# Patient Record
Sex: FEMALE | Race: WHITE | Employment: PART TIME | URBAN - METROPOLITAN AREA
[De-identification: names, ages, dates, MRNs, and addresses within clinical notes are randomized per-mention and may not be internally consistent; named-entity substitution may affect disease eponyms.]

---

## 2019-10-14 ENCOUNTER — HOSPITAL ENCOUNTER (INPATIENT)
Age: 69
LOS: 2 days | Discharge: HOME | DRG: 286 | End: 2019-10-16
Attending: EMERGENCY MEDICINE | Admitting: INTERNAL MEDICINE
Payer: MEDICARE

## 2019-10-14 ENCOUNTER — APPOINTMENT (OUTPATIENT)
Dept: CT IMAGING | Age: 69
DRG: 286 | End: 2019-10-14
Payer: MEDICARE

## 2019-10-14 ENCOUNTER — APPOINTMENT (OUTPATIENT)
Dept: GENERAL RADIOLOGY | Age: 69
DRG: 286 | End: 2019-10-14
Payer: MEDICARE

## 2019-10-14 DIAGNOSIS — J81.0 ACUTE PULMONARY EDEMA (HCC): Primary | ICD-10-CM

## 2019-10-14 DIAGNOSIS — R09.02 HYPOXIA: ICD-10-CM

## 2019-10-14 DIAGNOSIS — I50.23 ACUTE ON CHRONIC SYSTOLIC (CONGESTIVE) HEART FAILURE (HCC): ICD-10-CM

## 2019-10-14 DIAGNOSIS — I50.9 ACUTE CONGESTIVE HEART FAILURE, UNSPECIFIED HEART FAILURE TYPE (HCC): ICD-10-CM

## 2019-10-14 LAB
A/G RATIO: 1.5 (ref 1.1–2.2)
ALBUMIN SERPL-MCNC: 4.7 G/DL (ref 3.4–5)
ALP BLD-CCNC: 76 U/L (ref 40–129)
ALT SERPL-CCNC: 23 U/L (ref 10–40)
ANION GAP SERPL CALCULATED.3IONS-SCNC: 18 MMOL/L (ref 3–16)
APTT: 29.9 SEC (ref 26–36)
AST SERPL-CCNC: 27 U/L (ref 15–37)
BASOPHILS ABSOLUTE: 0.1 K/UL (ref 0–0.2)
BASOPHILS RELATIVE PERCENT: 0.7 %
BILIRUB SERPL-MCNC: 0.6 MG/DL (ref 0–1)
BUN BLDV-MCNC: 12 MG/DL (ref 7–20)
CALCIUM SERPL-MCNC: 9.3 MG/DL (ref 8.3–10.6)
CHLORIDE BLD-SCNC: 104 MMOL/L (ref 99–110)
CO2: 20 MMOL/L (ref 21–32)
CREAT SERPL-MCNC: 0.7 MG/DL (ref 0.6–1.2)
EKG ATRIAL RATE: 118 BPM
EKG DIAGNOSIS: NORMAL
EKG P AXIS: 66 DEGREES
EKG P-R INTERVAL: 134 MS
EKG Q-T INTERVAL: 344 MS
EKG QRS DURATION: 98 MS
EKG QTC CALCULATION (BAZETT): 482 MS
EKG R AXIS: 66 DEGREES
EKG T AXIS: 141 DEGREES
EKG VENTRICULAR RATE: 118 BPM
EOSINOPHILS ABSOLUTE: 0.1 K/UL (ref 0–0.6)
EOSINOPHILS RELATIVE PERCENT: 1.6 %
GFR AFRICAN AMERICAN: >60
GFR NON-AFRICAN AMERICAN: >60
GLOBULIN: 3.1 G/DL
GLUCOSE BLD-MCNC: 206 MG/DL (ref 70–99)
HCT VFR BLD CALC: 44.6 % (ref 36–48)
HEMOGLOBIN: 14.7 G/DL (ref 12–16)
INR BLD: 0.93 (ref 0.86–1.14)
LYMPHOCYTES ABSOLUTE: 2.5 K/UL (ref 1–5.1)
LYMPHOCYTES RELATIVE PERCENT: 26.9 %
MCH RBC QN AUTO: 30.2 PG (ref 26–34)
MCHC RBC AUTO-ENTMCNC: 33 G/DL (ref 31–36)
MCV RBC AUTO: 91.5 FL (ref 80–100)
MONOCYTES ABSOLUTE: 0.6 K/UL (ref 0–1.3)
MONOCYTES RELATIVE PERCENT: 6.5 %
NEUTROPHILS ABSOLUTE: 5.9 K/UL (ref 1.7–7.7)
NEUTROPHILS RELATIVE PERCENT: 64.3 %
PDW BLD-RTO: 13.6 % (ref 12.4–15.4)
PLATELET # BLD: 274 K/UL (ref 135–450)
PMV BLD AUTO: 9.4 FL (ref 5–10.5)
POTASSIUM REFLEX MAGNESIUM: 3.8 MMOL/L (ref 3.5–5.1)
PRO-BNP: 1224 PG/ML (ref 0–124)
PROTHROMBIN TIME: 10.6 SEC (ref 9.8–13)
RBC # BLD: 4.88 M/UL (ref 4–5.2)
SODIUM BLD-SCNC: 142 MMOL/L (ref 136–145)
TOTAL PROTEIN: 7.8 G/DL (ref 6.4–8.2)
TROPONIN: 0.04 NG/ML
TROPONIN: 0.04 NG/ML
TROPONIN: <0.01 NG/ML
WBC # BLD: 9.1 K/UL (ref 4–11)

## 2019-10-14 PROCEDURE — 2700000000 HC OXYGEN THERAPY PER DAY

## 2019-10-14 PROCEDURE — 71260 CT THORAX DX C+: CPT

## 2019-10-14 PROCEDURE — 6360000002 HC RX W HCPCS: Performed by: PHYSICIAN ASSISTANT

## 2019-10-14 PROCEDURE — 80053 COMPREHEN METABOLIC PANEL: CPT

## 2019-10-14 PROCEDURE — 85610 PROTHROMBIN TIME: CPT

## 2019-10-14 PROCEDURE — 83880 ASSAY OF NATRIURETIC PEPTIDE: CPT

## 2019-10-14 PROCEDURE — 84484 ASSAY OF TROPONIN QUANT: CPT

## 2019-10-14 PROCEDURE — 85025 COMPLETE CBC W/AUTO DIFF WBC: CPT

## 2019-10-14 PROCEDURE — 93005 ELECTROCARDIOGRAM TRACING: CPT | Performed by: EMERGENCY MEDICINE

## 2019-10-14 PROCEDURE — 2580000003 HC RX 258: Performed by: INTERNAL MEDICINE

## 2019-10-14 PROCEDURE — 83036 HEMOGLOBIN GLYCOSYLATED A1C: CPT

## 2019-10-14 PROCEDURE — 1200000000 HC SEMI PRIVATE

## 2019-10-14 PROCEDURE — 6370000000 HC RX 637 (ALT 250 FOR IP): Performed by: PHYSICIAN ASSISTANT

## 2019-10-14 PROCEDURE — 71045 X-RAY EXAM CHEST 1 VIEW: CPT

## 2019-10-14 PROCEDURE — 85730 THROMBOPLASTIN TIME PARTIAL: CPT

## 2019-10-14 PROCEDURE — 6360000002 HC RX W HCPCS: Performed by: INTERNAL MEDICINE

## 2019-10-14 PROCEDURE — 99285 EMERGENCY DEPT VISIT HI MDM: CPT

## 2019-10-14 PROCEDURE — 94660 CPAP INITIATION&MGMT: CPT

## 2019-10-14 PROCEDURE — 6360000004 HC RX CONTRAST MEDICATION: Performed by: EMERGENCY MEDICINE

## 2019-10-14 PROCEDURE — 6370000000 HC RX 637 (ALT 250 FOR IP): Performed by: INTERNAL MEDICINE

## 2019-10-14 PROCEDURE — 36415 COLL VENOUS BLD VENIPUNCTURE: CPT

## 2019-10-14 PROCEDURE — 94761 N-INVAS EAR/PLS OXIMETRY MLT: CPT

## 2019-10-14 PROCEDURE — 94760 N-INVAS EAR/PLS OXIMETRY 1: CPT

## 2019-10-14 PROCEDURE — 96374 THER/PROPH/DIAG INJ IV PUSH: CPT

## 2019-10-14 RX ORDER — FUROSEMIDE 10 MG/ML
40 INJECTION INTRAMUSCULAR; INTRAVENOUS ONCE
Status: COMPLETED | OUTPATIENT
Start: 2019-10-14 | End: 2019-10-14

## 2019-10-14 RX ORDER — ACETAMINOPHEN 325 MG/1
650 TABLET ORAL EVERY 4 HOURS PRN
Status: DISCONTINUED | OUTPATIENT
Start: 2019-10-14 | End: 2019-10-16 | Stop reason: SDUPTHER

## 2019-10-14 RX ORDER — SODIUM CHLORIDE 0.9 % (FLUSH) 0.9 %
10 SYRINGE (ML) INJECTION EVERY 12 HOURS SCHEDULED
Status: DISCONTINUED | OUTPATIENT
Start: 2019-10-14 | End: 2019-10-16 | Stop reason: SDUPTHER

## 2019-10-14 RX ORDER — FUROSEMIDE 10 MG/ML
40 INJECTION INTRAMUSCULAR; INTRAVENOUS DAILY
Status: DISCONTINUED | OUTPATIENT
Start: 2019-10-14 | End: 2019-10-15

## 2019-10-14 RX ORDER — MAGNESIUM SULFATE 1 G/100ML
1 INJECTION INTRAVENOUS PRN
Status: DISCONTINUED | OUTPATIENT
Start: 2019-10-14 | End: 2019-10-16 | Stop reason: HOSPADM

## 2019-10-14 RX ORDER — LISINOPRIL 5 MG/1
5 TABLET ORAL DAILY
Status: DISCONTINUED | OUTPATIENT
Start: 2019-10-14 | End: 2019-10-15

## 2019-10-14 RX ORDER — ONDANSETRON 2 MG/ML
4 INJECTION INTRAMUSCULAR; INTRAVENOUS EVERY 6 HOURS PRN
Status: DISCONTINUED | OUTPATIENT
Start: 2019-10-14 | End: 2019-10-16 | Stop reason: SDUPTHER

## 2019-10-14 RX ORDER — ACETAMINOPHEN 325 MG/1
650 TABLET ORAL ONCE
Status: COMPLETED | OUTPATIENT
Start: 2019-10-14 | End: 2019-10-14

## 2019-10-14 RX ORDER — POTASSIUM CHLORIDE 20 MEQ/1
40 TABLET, EXTENDED RELEASE ORAL PRN
Status: DISCONTINUED | OUTPATIENT
Start: 2019-10-14 | End: 2019-10-16 | Stop reason: HOSPADM

## 2019-10-14 RX ORDER — POTASSIUM CHLORIDE 7.45 MG/ML
10 INJECTION INTRAVENOUS PRN
Status: DISCONTINUED | OUTPATIENT
Start: 2019-10-14 | End: 2019-10-16 | Stop reason: HOSPADM

## 2019-10-14 RX ORDER — ATORVASTATIN CALCIUM 20 MG/1
20 TABLET, FILM COATED ORAL NIGHTLY
Status: DISCONTINUED | OUTPATIENT
Start: 2019-10-14 | End: 2019-10-15

## 2019-10-14 RX ORDER — ASPIRIN 81 MG/1
81 TABLET, CHEWABLE ORAL DAILY
Status: DISCONTINUED | OUTPATIENT
Start: 2019-10-14 | End: 2019-10-16 | Stop reason: HOSPADM

## 2019-10-14 RX ORDER — METOPROLOL SUCCINATE 25 MG/1
25 TABLET, EXTENDED RELEASE ORAL DAILY
Status: DISCONTINUED | OUTPATIENT
Start: 2019-10-14 | End: 2019-10-15

## 2019-10-14 RX ORDER — SODIUM CHLORIDE 0.9 % (FLUSH) 0.9 %
10 SYRINGE (ML) INJECTION PRN
Status: DISCONTINUED | OUTPATIENT
Start: 2019-10-14 | End: 2019-10-16 | Stop reason: SDUPTHER

## 2019-10-14 RX ADMIN — IOPAMIDOL 75 ML: 755 INJECTION, SOLUTION INTRAVENOUS at 11:05

## 2019-10-14 RX ADMIN — NITROGLYCERIN 0.5 INCH: 20 OINTMENT TOPICAL at 10:34

## 2019-10-14 RX ADMIN — ACETAMINOPHEN 650 MG: 325 TABLET ORAL at 21:00

## 2019-10-14 RX ADMIN — ASPIRIN 81 MG 81 MG: 81 TABLET ORAL at 16:30

## 2019-10-14 RX ADMIN — ATORVASTATIN CALCIUM 20 MG: 20 TABLET, FILM COATED ORAL at 21:00

## 2019-10-14 RX ADMIN — METOPROLOL SUCCINATE 25 MG: 25 TABLET, EXTENDED RELEASE ORAL at 16:30

## 2019-10-14 RX ADMIN — FUROSEMIDE 40 MG: 10 INJECTION, SOLUTION INTRAMUSCULAR; INTRAVENOUS at 10:34

## 2019-10-14 RX ADMIN — ACETAMINOPHEN 650 MG: 325 TABLET ORAL at 12:28

## 2019-10-14 RX ADMIN — ACETAMINOPHEN 650 MG: 325 TABLET ORAL at 16:46

## 2019-10-14 RX ADMIN — Medication 10 ML: at 21:01

## 2019-10-14 RX ADMIN — FUROSEMIDE 40 MG: 10 INJECTION, SOLUTION INTRAMUSCULAR; INTRAVENOUS at 16:30

## 2019-10-14 RX ADMIN — LISINOPRIL 5 MG: 5 TABLET ORAL at 16:46

## 2019-10-14 ASSESSMENT — ENCOUNTER SYMPTOMS
SHORTNESS OF BREATH: 1
CONSTIPATION: 0
DIARRHEA: 0
VOMITING: 0
COUGH: 0
NAUSEA: 0
COLOR CHANGE: 0
ABDOMINAL PAIN: 0

## 2019-10-14 ASSESSMENT — PAIN SCALES - GENERAL
PAINLEVEL_OUTOF10: 8
PAINLEVEL_OUTOF10: 4
PAINLEVEL_OUTOF10: 5

## 2019-10-14 ASSESSMENT — PAIN DESCRIPTION - PAIN TYPE
TYPE: ACUTE PAIN

## 2019-10-14 ASSESSMENT — PAIN DESCRIPTION - DESCRIPTORS
DESCRIPTORS: HEADACHE
DESCRIPTORS: HEADACHE
DESCRIPTORS: DULL

## 2019-10-14 ASSESSMENT — PAIN DESCRIPTION - ORIENTATION
ORIENTATION: ANTERIOR

## 2019-10-14 ASSESSMENT — PAIN DESCRIPTION - LOCATION
LOCATION: HEAD

## 2019-10-14 ASSESSMENT — PAIN DESCRIPTION - PROGRESSION
CLINICAL_PROGRESSION: NOT CHANGED
CLINICAL_PROGRESSION: GRADUALLY IMPROVING
CLINICAL_PROGRESSION: NOT CHANGED
CLINICAL_PROGRESSION: GRADUALLY IMPROVING

## 2019-10-14 ASSESSMENT — PAIN DESCRIPTION - ONSET
ONSET: GRADUAL

## 2019-10-14 ASSESSMENT — PAIN - FUNCTIONAL ASSESSMENT
PAIN_FUNCTIONAL_ASSESSMENT: ACTIVITIES ARE NOT PREVENTED
PAIN_FUNCTIONAL_ASSESSMENT: ACTIVITIES ARE NOT PREVENTED
PAIN_FUNCTIONAL_ASSESSMENT: PREVENTS OR INTERFERES SOME ACTIVE ACTIVITIES AND ADLS
PAIN_FUNCTIONAL_ASSESSMENT: PREVENTS OR INTERFERES SOME ACTIVE ACTIVITIES AND ADLS

## 2019-10-14 ASSESSMENT — PAIN DESCRIPTION - FREQUENCY
FREQUENCY: CONTINUOUS

## 2019-10-15 ENCOUNTER — APPOINTMENT (OUTPATIENT)
Dept: CARDIAC CATH/INVASIVE PROCEDURES | Age: 69
DRG: 286 | End: 2019-10-15
Payer: MEDICARE

## 2019-10-15 LAB
ANION GAP SERPL CALCULATED.3IONS-SCNC: 15 MMOL/L (ref 3–16)
BUN BLDV-MCNC: 28 MG/DL (ref 7–20)
CALCIUM SERPL-MCNC: 9.1 MG/DL (ref 8.3–10.6)
CHLORIDE BLD-SCNC: 103 MMOL/L (ref 99–110)
CHOLESTEROL, TOTAL: 222 MG/DL (ref 0–199)
CO2: 22 MMOL/L (ref 21–32)
CREAT SERPL-MCNC: 0.6 MG/DL (ref 0.6–1.2)
ESTIMATED AVERAGE GLUCOSE: 102.5 MG/DL
GFR AFRICAN AMERICAN: >60
GFR NON-AFRICAN AMERICAN: >60
GLUCOSE BLD-MCNC: 118 MG/DL (ref 70–99)
HBA1C MFR BLD: 5.2 %
HDLC SERPL-MCNC: 79 MG/DL (ref 40–60)
LDL CHOLESTEROL CALCULATED: 121 MG/DL
LV EF: 38 %
LVEF MODALITY: NORMAL
MAGNESIUM: 2.3 MG/DL (ref 1.8–2.4)
POTASSIUM SERPL-SCNC: 4.2 MMOL/L (ref 3.5–5.1)
SODIUM BLD-SCNC: 140 MMOL/L (ref 136–145)
TRIGL SERPL-MCNC: 109 MG/DL (ref 0–150)
TROPONIN: 0.02 NG/ML
VLDLC SERPL CALC-MCNC: 22 MG/DL

## 2019-10-15 PROCEDURE — 2580000003 HC RX 258: Performed by: INTERNAL MEDICINE

## 2019-10-15 PROCEDURE — 90686 IIV4 VACC NO PRSV 0.5 ML IM: CPT | Performed by: INTERNAL MEDICINE

## 2019-10-15 PROCEDURE — 80048 BASIC METABOLIC PNL TOTAL CA: CPT

## 2019-10-15 PROCEDURE — G0008 ADMIN INFLUENZA VIRUS VAC: HCPCS | Performed by: INTERNAL MEDICINE

## 2019-10-15 PROCEDURE — 93306 TTE W/DOPPLER COMPLETE: CPT

## 2019-10-15 PROCEDURE — 2700000000 HC OXYGEN THERAPY PER DAY

## 2019-10-15 PROCEDURE — 6370000000 HC RX 637 (ALT 250 FOR IP): Performed by: INTERNAL MEDICINE

## 2019-10-15 PROCEDURE — 36415 COLL VENOUS BLD VENIPUNCTURE: CPT

## 2019-10-15 PROCEDURE — 1200000000 HC SEMI PRIVATE

## 2019-10-15 PROCEDURE — 94761 N-INVAS EAR/PLS OXIMETRY MLT: CPT

## 2019-10-15 PROCEDURE — 84484 ASSAY OF TROPONIN QUANT: CPT

## 2019-10-15 PROCEDURE — 99223 1ST HOSP IP/OBS HIGH 75: CPT | Performed by: INTERNAL MEDICINE

## 2019-10-15 PROCEDURE — 83735 ASSAY OF MAGNESIUM: CPT

## 2019-10-15 PROCEDURE — 6360000002 HC RX W HCPCS: Performed by: INTERNAL MEDICINE

## 2019-10-15 PROCEDURE — 80061 LIPID PANEL: CPT

## 2019-10-15 RX ORDER — FUROSEMIDE 40 MG/1
40 TABLET ORAL DAILY
Status: DISCONTINUED | OUTPATIENT
Start: 2019-10-16 | End: 2019-10-16

## 2019-10-15 RX ORDER — CARVEDILOL 6.25 MG/1
6.25 TABLET ORAL 2 TIMES DAILY WITH MEALS
Status: DISCONTINUED | OUTPATIENT
Start: 2019-10-16 | End: 2019-10-16 | Stop reason: HOSPADM

## 2019-10-15 RX ORDER — VALSARTAN 80 MG/1
80 TABLET ORAL DAILY
Status: DISCONTINUED | OUTPATIENT
Start: 2019-10-16 | End: 2019-10-16 | Stop reason: HOSPADM

## 2019-10-15 RX ORDER — ATORVASTATIN CALCIUM 40 MG/1
40 TABLET, FILM COATED ORAL NIGHTLY
Status: DISCONTINUED | OUTPATIENT
Start: 2019-10-15 | End: 2019-10-16 | Stop reason: HOSPADM

## 2019-10-15 RX ADMIN — METOPROLOL SUCCINATE 25 MG: 25 TABLET, EXTENDED RELEASE ORAL at 09:34

## 2019-10-15 RX ADMIN — LISINOPRIL 5 MG: 5 TABLET ORAL at 09:34

## 2019-10-15 RX ADMIN — ATORVASTATIN CALCIUM 40 MG: 40 TABLET, FILM COATED ORAL at 20:53

## 2019-10-15 RX ADMIN — INFLUENZA A VIRUS A/BRISBANE/02/2018 IVR-190 (H1N1) ANTIGEN (PROPIOLACTONE INACTIVATED), INFLUENZA A VIRUS A/KANSAS/14/2017 X-327 (H3N2) ANTIGEN (PROPIOLACTONE INACTIVATED), INFLUENZA B VIRUS B/MARYLAND/15/2016 ANTIGEN (PROPIOLACTONE INACTIVATED), INFLUENZA B VIRUS B/PHUKET/3073/2013 BVR-1B ANTIGEN (PROPIOLACTONE INACTIVATED) 0.5 ML: 15; 15; 15; 15 INJECTION, SUSPENSION INTRAMUSCULAR at 09:35

## 2019-10-15 RX ADMIN — ENOXAPARIN SODIUM 40 MG: 40 INJECTION SUBCUTANEOUS at 09:34

## 2019-10-15 RX ADMIN — Medication 10 ML: at 09:41

## 2019-10-15 RX ADMIN — FUROSEMIDE 40 MG: 10 INJECTION, SOLUTION INTRAMUSCULAR; INTRAVENOUS at 09:34

## 2019-10-15 RX ADMIN — Medication 10 ML: at 20:53

## 2019-10-15 RX ADMIN — ASPIRIN 81 MG 81 MG: 81 TABLET ORAL at 09:34

## 2019-10-15 ASSESSMENT — PAIN SCALES - GENERAL
PAINLEVEL_OUTOF10: 0

## 2019-10-16 VITALS
RESPIRATION RATE: 16 BRPM | HEIGHT: 60 IN | SYSTOLIC BLOOD PRESSURE: 134 MMHG | WEIGHT: 142.64 LBS | BODY MASS INDEX: 28 KG/M2 | HEART RATE: 64 BPM | DIASTOLIC BLOOD PRESSURE: 82 MMHG | TEMPERATURE: 97.7 F | OXYGEN SATURATION: 96 %

## 2019-10-16 LAB
ANION GAP SERPL CALCULATED.3IONS-SCNC: 13 MMOL/L (ref 3–16)
BUN BLDV-MCNC: 29 MG/DL (ref 7–20)
CALCIUM SERPL-MCNC: 8.8 MG/DL (ref 8.3–10.6)
CHLORIDE BLD-SCNC: 105 MMOL/L (ref 99–110)
CO2: 23 MMOL/L (ref 21–32)
CREAT SERPL-MCNC: 0.5 MG/DL (ref 0.6–1.2)
GFR AFRICAN AMERICAN: >60
GFR NON-AFRICAN AMERICAN: >60
GLUCOSE BLD-MCNC: 93 MG/DL (ref 70–99)
MAGNESIUM: 2.2 MG/DL (ref 1.8–2.4)
POTASSIUM SERPL-SCNC: 4.2 MMOL/L (ref 3.5–5.1)
PRO-BNP: 1593 PG/ML (ref 0–124)
SODIUM BLD-SCNC: 141 MMOL/L (ref 136–145)

## 2019-10-16 PROCEDURE — 6360000002 HC RX W HCPCS

## 2019-10-16 PROCEDURE — C1769 GUIDE WIRE: HCPCS

## 2019-10-16 PROCEDURE — 6370000000 HC RX 637 (ALT 250 FOR IP): Performed by: INTERNAL MEDICINE

## 2019-10-16 PROCEDURE — B2111ZZ FLUOROSCOPY OF MULTIPLE CORONARY ARTERIES USING LOW OSMOLAR CONTRAST: ICD-10-PCS | Performed by: INTERNAL MEDICINE

## 2019-10-16 PROCEDURE — 6360000002 HC RX W HCPCS: Performed by: INTERNAL MEDICINE

## 2019-10-16 PROCEDURE — C1894 INTRO/SHEATH, NON-LASER: HCPCS

## 2019-10-16 PROCEDURE — 93458 L HRT ARTERY/VENTRICLE ANGIO: CPT

## 2019-10-16 PROCEDURE — 93458 L HRT ARTERY/VENTRICLE ANGIO: CPT | Performed by: INTERNAL MEDICINE

## 2019-10-16 PROCEDURE — 80048 BASIC METABOLIC PNL TOTAL CA: CPT

## 2019-10-16 PROCEDURE — 6360000004 HC RX CONTRAST MEDICATION: Performed by: INTERNAL MEDICINE

## 2019-10-16 PROCEDURE — 83735 ASSAY OF MAGNESIUM: CPT

## 2019-10-16 PROCEDURE — 2500000003 HC RX 250 WO HCPCS

## 2019-10-16 PROCEDURE — 99152 MOD SED SAME PHYS/QHP 5/>YRS: CPT

## 2019-10-16 PROCEDURE — 99232 SBSQ HOSP IP/OBS MODERATE 35: CPT | Performed by: INTERNAL MEDICINE

## 2019-10-16 PROCEDURE — 4A023N7 MEASUREMENT OF CARDIAC SAMPLING AND PRESSURE, LEFT HEART, PERCUTANEOUS APPROACH: ICD-10-PCS | Performed by: INTERNAL MEDICINE

## 2019-10-16 PROCEDURE — B2151ZZ FLUOROSCOPY OF LEFT HEART USING LOW OSMOLAR CONTRAST: ICD-10-PCS | Performed by: INTERNAL MEDICINE

## 2019-10-16 PROCEDURE — 99153 MOD SED SAME PHYS/QHP EA: CPT

## 2019-10-16 PROCEDURE — 83880 ASSAY OF NATRIURETIC PEPTIDE: CPT

## 2019-10-16 PROCEDURE — 94760 N-INVAS EAR/PLS OXIMETRY 1: CPT

## 2019-10-16 PROCEDURE — 2709999900 HC NON-CHARGEABLE SUPPLY

## 2019-10-16 PROCEDURE — 2580000003 HC RX 258: Performed by: INTERNAL MEDICINE

## 2019-10-16 PROCEDURE — 36415 COLL VENOUS BLD VENIPUNCTURE: CPT

## 2019-10-16 RX ORDER — VALSARTAN 80 MG/1
80 TABLET ORAL DAILY
Qty: 30 TABLET | Refills: 3 | Status: SHIPPED | OUTPATIENT
Start: 2019-10-17 | End: 2019-10-16

## 2019-10-16 RX ORDER — FUROSEMIDE 20 MG/1
20 TABLET ORAL DAILY
Qty: 60 TABLET | Refills: 3 | Status: SHIPPED | OUTPATIENT
Start: 2019-10-16 | End: 2019-12-03 | Stop reason: SDUPTHER

## 2019-10-16 RX ORDER — SODIUM CHLORIDE 0.9 % (FLUSH) 0.9 %
10 SYRINGE (ML) INJECTION EVERY 12 HOURS SCHEDULED
Status: DISCONTINUED | OUTPATIENT
Start: 2019-10-16 | End: 2019-10-16 | Stop reason: HOSPADM

## 2019-10-16 RX ORDER — CARVEDILOL 6.25 MG/1
6.25 TABLET ORAL 2 TIMES DAILY WITH MEALS
Qty: 60 TABLET | Refills: 3 | Status: SHIPPED | OUTPATIENT
Start: 2019-10-16 | End: 2019-12-03 | Stop reason: SDUPTHER

## 2019-10-16 RX ORDER — ATORVASTATIN CALCIUM 40 MG/1
40 TABLET, FILM COATED ORAL NIGHTLY
Qty: 30 TABLET | Refills: 3 | Status: SHIPPED | OUTPATIENT
Start: 2019-10-16 | End: 2019-10-16

## 2019-10-16 RX ORDER — ONDANSETRON 2 MG/ML
4 INJECTION INTRAMUSCULAR; INTRAVENOUS EVERY 6 HOURS PRN
Status: DISCONTINUED | OUTPATIENT
Start: 2019-10-16 | End: 2019-10-16 | Stop reason: HOSPADM

## 2019-10-16 RX ORDER — ATORVASTATIN CALCIUM 40 MG/1
40 TABLET, FILM COATED ORAL NIGHTLY
Qty: 30 TABLET | Refills: 3 | Status: SHIPPED | OUTPATIENT
Start: 2019-10-16 | End: 2019-12-03 | Stop reason: SDUPTHER

## 2019-10-16 RX ORDER — ASPIRIN 81 MG/1
81 TABLET, CHEWABLE ORAL DAILY
Qty: 30 TABLET | Refills: 3 | Status: SHIPPED | OUTPATIENT
Start: 2019-10-17 | End: 2019-12-03 | Stop reason: SDUPTHER

## 2019-10-16 RX ORDER — FUROSEMIDE 20 MG/1
20 TABLET ORAL DAILY
Status: DISCONTINUED | OUTPATIENT
Start: 2019-10-17 | End: 2019-10-16 | Stop reason: HOSPADM

## 2019-10-16 RX ORDER — FUROSEMIDE 20 MG/1
20 TABLET ORAL DAILY
Qty: 60 TABLET | Refills: 3 | Status: SHIPPED | OUTPATIENT
Start: 2019-10-16 | End: 2019-10-16 | Stop reason: SDUPTHER

## 2019-10-16 RX ORDER — ACETAMINOPHEN 325 MG/1
650 TABLET ORAL EVERY 4 HOURS PRN
Status: DISCONTINUED | OUTPATIENT
Start: 2019-10-16 | End: 2019-10-16 | Stop reason: HOSPADM

## 2019-10-16 RX ORDER — SODIUM CHLORIDE 0.9 % (FLUSH) 0.9 %
10 SYRINGE (ML) INJECTION PRN
Status: DISCONTINUED | OUTPATIENT
Start: 2019-10-16 | End: 2019-10-16 | Stop reason: HOSPADM

## 2019-10-16 RX ORDER — CARVEDILOL 6.25 MG/1
6.25 TABLET ORAL 2 TIMES DAILY WITH MEALS
Qty: 60 TABLET | Refills: 3 | Status: SHIPPED | OUTPATIENT
Start: 2019-10-16 | End: 2019-10-16

## 2019-10-16 RX ORDER — ASPIRIN 81 MG/1
81 TABLET, CHEWABLE ORAL DAILY
Qty: 30 TABLET | Refills: 3 | Status: SHIPPED | OUTPATIENT
Start: 2019-10-17 | End: 2019-10-16

## 2019-10-16 RX ORDER — VALSARTAN 80 MG/1
80 TABLET ORAL DAILY
Qty: 30 TABLET | Refills: 3 | Status: SHIPPED | OUTPATIENT
Start: 2019-10-17 | End: 2019-10-23 | Stop reason: ALTCHOICE

## 2019-10-16 RX ADMIN — ASPIRIN 81 MG 81 MG: 81 TABLET ORAL at 08:20

## 2019-10-16 RX ADMIN — CARVEDILOL 6.25 MG: 6.25 TABLET, FILM COATED ORAL at 08:20

## 2019-10-16 RX ADMIN — ONDANSETRON 4 MG: 2 INJECTION INTRAMUSCULAR; INTRAVENOUS at 15:47

## 2019-10-16 RX ADMIN — VALSARTAN 80 MG: 80 TABLET, FILM COATED ORAL at 08:20

## 2019-10-16 RX ADMIN — Medication 10 ML: at 08:20

## 2019-10-16 RX ADMIN — IOPAMIDOL 50 ML: 755 INJECTION, SOLUTION INTRAVENOUS at 10:51

## 2019-10-18 ENCOUNTER — TELEPHONE (OUTPATIENT)
Dept: PHARMACY | Age: 69
End: 2019-10-18

## 2019-10-23 ENCOUNTER — OFFICE VISIT (OUTPATIENT)
Dept: CARDIOLOGY CLINIC | Age: 69
End: 2019-10-23
Payer: MEDICARE

## 2019-10-23 VITALS
WEIGHT: 139.2 LBS | DIASTOLIC BLOOD PRESSURE: 70 MMHG | HEIGHT: 60 IN | HEART RATE: 63 BPM | SYSTOLIC BLOOD PRESSURE: 118 MMHG | OXYGEN SATURATION: 97 % | BODY MASS INDEX: 27.33 KG/M2

## 2019-10-23 DIAGNOSIS — I25.10 CORONARY ARTERY DISEASE INVOLVING NATIVE CORONARY ARTERY OF NATIVE HEART WITHOUT ANGINA PECTORIS: ICD-10-CM

## 2019-10-23 DIAGNOSIS — I50.22 CHRONIC SYSTOLIC HF (HEART FAILURE) (HCC): ICD-10-CM

## 2019-10-23 DIAGNOSIS — E78.5 HYPERLIPIDEMIA LDL GOAL <70: ICD-10-CM

## 2019-10-23 DIAGNOSIS — I10 ESSENTIAL HYPERTENSION: ICD-10-CM

## 2019-10-23 DIAGNOSIS — I25.5 ISCHEMIC CARDIOMYOPATHY: Primary | ICD-10-CM

## 2019-10-23 PROCEDURE — 99214 OFFICE O/P EST MOD 30 MIN: CPT | Performed by: NURSE PRACTITIONER

## 2019-10-30 ENCOUNTER — TELEPHONE (OUTPATIENT)
Dept: PHARMACY | Age: 69
End: 2019-10-30

## 2019-10-30 DIAGNOSIS — I50.22 CHRONIC SYSTOLIC HF (HEART FAILURE) (HCC): ICD-10-CM

## 2019-10-30 DIAGNOSIS — I25.5 ISCHEMIC CARDIOMYOPATHY: ICD-10-CM

## 2019-10-30 LAB
ANION GAP SERPL CALCULATED.3IONS-SCNC: 16 MMOL/L (ref 3–16)
BUN BLDV-MCNC: 16 MG/DL (ref 7–20)
CALCIUM SERPL-MCNC: 9.3 MG/DL (ref 8.3–10.6)
CHLORIDE BLD-SCNC: 104 MMOL/L (ref 99–110)
CO2: 23 MMOL/L (ref 21–32)
CREAT SERPL-MCNC: 0.6 MG/DL (ref 0.6–1.2)
GFR AFRICAN AMERICAN: >60
GFR NON-AFRICAN AMERICAN: >60
GLUCOSE BLD-MCNC: 101 MG/DL (ref 70–99)
POTASSIUM SERPL-SCNC: 4.3 MMOL/L (ref 3.5–5.1)
SODIUM BLD-SCNC: 143 MMOL/L (ref 136–145)

## 2019-11-23 ENCOUNTER — APPOINTMENT (OUTPATIENT)
Dept: CT IMAGING | Age: 69
End: 2019-11-23
Payer: MEDICARE

## 2019-11-23 ENCOUNTER — HOSPITAL ENCOUNTER (EMERGENCY)
Age: 69
Discharge: HOME | End: 2019-11-23
Attending: EMERGENCY MEDICINE
Payer: MEDICARE

## 2019-11-23 VITALS
SYSTOLIC BLOOD PRESSURE: 102 MMHG | OXYGEN SATURATION: 95 % | HEART RATE: 71 BPM | DIASTOLIC BLOOD PRESSURE: 54 MMHG | RESPIRATION RATE: 16 BRPM | TEMPERATURE: 97.4 F

## 2019-11-23 DIAGNOSIS — S01.511A LIP LACERATION, INITIAL ENCOUNTER: ICD-10-CM
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
## 2019-11-23 DIAGNOSIS — S02.2XXA CLOSED FRACTURE OF NASAL BONE, INITIAL ENCOUNTER: Primary | ICD-10-CM

## 2019-11-23 PROCEDURE — 99284 EMERGENCY DEPT VISIT MOD MDM: CPT

## 2019-11-23 PROCEDURE — 4500000024 HC ED LEVEL 4 PROCEDURE

## 2019-11-23 PROCEDURE — 70450 CT HEAD/BRAIN W/O DYE: CPT

## 2019-11-23 PROCEDURE — 70486 CT MAXILLOFACIAL W/O DYE: CPT

## 2019-11-23 RX ORDER — LIDOCAINE HYDROCHLORIDE 10 MG/ML
5 INJECTION, SOLUTION INFILTRATION; PERINEURAL ONCE
Status: DISCONTINUED | OUTPATIENT
Start: 2019-11-23 | End: 2019-11-23 | Stop reason: HOSPADM

## 2019-11-23 RX ORDER — HYDROCODONE BITARTRATE AND ACETAMINOPHEN 5; 325 MG/1; MG/1
1 TABLET ORAL EVERY 6 HOURS PRN
Qty: 10 TABLET | Refills: 0 | Status: SHIPPED | OUTPATIENT
Start: 2019-11-23 | End: 2019-11-26

## 2019-11-23 RX ORDER — AMOXICILLIN AND CLAVULANATE POTASSIUM 875; 125 MG/1; MG/1
1 TABLET, FILM COATED ORAL 2 TIMES DAILY
Qty: 20 TABLET | Refills: 0 | Status: SHIPPED | OUTPATIENT
Start: 2019-11-23 | End: 2019-12-03 | Stop reason: ALTCHOICE

## 2019-11-23 ASSESSMENT — ENCOUNTER SYMPTOMS
FACIAL SWELLING: 1
SHORTNESS OF BREATH: 0

## 2019-11-26 ENCOUNTER — OFFICE VISIT (OUTPATIENT)
Dept: ENT CLINIC | Age: 69
End: 2019-11-26
Payer: MEDICARE

## 2019-11-26 VITALS
WEIGHT: 140 LBS | SYSTOLIC BLOOD PRESSURE: 126 MMHG | HEIGHT: 60 IN | TEMPERATURE: 97.1 F | HEART RATE: 68 BPM | BODY MASS INDEX: 27.48 KG/M2 | DIASTOLIC BLOOD PRESSURE: 79 MMHG

## 2019-11-26 DIAGNOSIS — S02.2XXA CLOSED FRACTURE OF NASAL BONE, INITIAL ENCOUNTER: Primary | ICD-10-CM

## 2019-11-26 PROCEDURE — 21315 CLSD TX NSL FX MNPJ WO STBLJ: CPT | Performed by: OTOLARYNGOLOGY

## 2019-11-26 PROCEDURE — 99203 OFFICE O/P NEW LOW 30 MIN: CPT | Performed by: OTOLARYNGOLOGY

## 2019-11-26 RX ORDER — IBUPROFEN 600 MG/1
600 TABLET ORAL 4 TIMES DAILY PRN
Qty: 120 TABLET | Refills: 5 | Status: SHIPPED | OUTPATIENT
Start: 2019-11-26 | End: 2019-12-03 | Stop reason: CLARIF

## 2019-11-26 RX ORDER — ECHINACEA PURPUREA EXTRACT 125 MG
TABLET ORAL
Qty: 1 BOTTLE | Refills: 3 | Status: SHIPPED | OUTPATIENT
Start: 2019-11-26 | End: 2020-12-16

## 2019-12-03 ENCOUNTER — OFFICE VISIT (OUTPATIENT)
Dept: CARDIOLOGY CLINIC | Age: 69
End: 2019-12-03
Payer: MEDICARE

## 2019-12-03 VITALS
SYSTOLIC BLOOD PRESSURE: 138 MMHG | BODY MASS INDEX: 26.9 KG/M2 | HEART RATE: 60 BPM | HEIGHT: 60 IN | OXYGEN SATURATION: 98 % | DIASTOLIC BLOOD PRESSURE: 78 MMHG | WEIGHT: 137 LBS

## 2019-12-03 DIAGNOSIS — I25.5 ISCHEMIC CARDIOMYOPATHY: ICD-10-CM

## 2019-12-03 DIAGNOSIS — I50.22 CHRONIC SYSTOLIC HF (HEART FAILURE) (HCC): ICD-10-CM

## 2019-12-03 DIAGNOSIS — I10 ESSENTIAL HYPERTENSION: ICD-10-CM

## 2019-12-03 DIAGNOSIS — E78.5 HYPERLIPIDEMIA LDL GOAL <70: ICD-10-CM

## 2019-12-03 DIAGNOSIS — I25.10 CORONARY ARTERY DISEASE INVOLVING NATIVE CORONARY ARTERY OF NATIVE HEART WITHOUT ANGINA PECTORIS: Primary | ICD-10-CM

## 2019-12-03 PROCEDURE — 99214 OFFICE O/P EST MOD 30 MIN: CPT | Performed by: NURSE PRACTITIONER

## 2019-12-03 RX ORDER — ATORVASTATIN CALCIUM 40 MG/1
40 TABLET, FILM COATED ORAL NIGHTLY
Qty: 30 TABLET | Refills: 3 | Status: SHIPPED | OUTPATIENT
Start: 2019-12-03 | End: 2020-02-13 | Stop reason: SDUPTHER

## 2019-12-03 RX ORDER — FUROSEMIDE 20 MG/1
20 TABLET ORAL DAILY
Qty: 60 TABLET | Refills: 3 | Status: SHIPPED
Start: 2019-12-03 | End: 2020-02-13 | Stop reason: DRUGHIGH

## 2019-12-03 RX ORDER — CARVEDILOL 6.25 MG/1
6.25 TABLET ORAL 2 TIMES DAILY WITH MEALS
Qty: 60 TABLET | Refills: 3 | Status: SHIPPED | OUTPATIENT
Start: 2019-12-03 | End: 2020-02-13 | Stop reason: SDUPTHER

## 2019-12-03 RX ORDER — ASPIRIN 81 MG/1
81 TABLET, CHEWABLE ORAL DAILY
Qty: 30 TABLET | Refills: 3 | Status: SHIPPED | OUTPATIENT
Start: 2019-12-03 | End: 2020-02-13 | Stop reason: SDUPTHER

## 2019-12-05 ENCOUNTER — OFFICE VISIT (OUTPATIENT)
Dept: ENT CLINIC | Age: 69
End: 2019-12-05

## 2019-12-05 VITALS
HEART RATE: 65 BPM | HEIGHT: 60 IN | SYSTOLIC BLOOD PRESSURE: 131 MMHG | DIASTOLIC BLOOD PRESSURE: 74 MMHG | WEIGHT: 140 LBS | TEMPERATURE: 97.1 F | BODY MASS INDEX: 27.48 KG/M2

## 2019-12-05 DIAGNOSIS — S02.2XXD CLOSED FRACTURE OF NASAL BONE WITH ROUTINE HEALING, SUBSEQUENT ENCOUNTER: Primary | ICD-10-CM

## 2019-12-05 PROCEDURE — 99024 POSTOP FOLLOW-UP VISIT: CPT | Performed by: OTOLARYNGOLOGY

## 2019-12-13 DIAGNOSIS — I50.22 CHRONIC SYSTOLIC HF (HEART FAILURE) (HCC): ICD-10-CM

## 2019-12-13 DIAGNOSIS — I25.5 ISCHEMIC CARDIOMYOPATHY: ICD-10-CM

## 2019-12-13 LAB
ANION GAP SERPL CALCULATED.3IONS-SCNC: 15 MMOL/L (ref 3–16)
BUN BLDV-MCNC: 13 MG/DL (ref 7–20)
CALCIUM SERPL-MCNC: 9.3 MG/DL (ref 8.3–10.6)
CHLORIDE BLD-SCNC: 103 MMOL/L (ref 99–110)
CO2: 25 MMOL/L (ref 21–32)
CREAT SERPL-MCNC: 0.5 MG/DL (ref 0.6–1.2)
GFR AFRICAN AMERICAN: >60
GFR NON-AFRICAN AMERICAN: >60
GLUCOSE BLD-MCNC: 93 MG/DL (ref 70–99)
POTASSIUM SERPL-SCNC: 4.1 MMOL/L (ref 3.5–5.1)
SODIUM BLD-SCNC: 143 MMOL/L (ref 136–145)

## 2019-12-17 ENCOUNTER — OFFICE VISIT (OUTPATIENT)
Dept: CARDIOLOGY CLINIC | Age: 69
End: 2019-12-17
Payer: MEDICARE

## 2019-12-17 VITALS
OXYGEN SATURATION: 99 % | SYSTOLIC BLOOD PRESSURE: 128 MMHG | DIASTOLIC BLOOD PRESSURE: 78 MMHG | HEIGHT: 60 IN | HEART RATE: 71 BPM | BODY MASS INDEX: 27.09 KG/M2 | WEIGHT: 138 LBS

## 2019-12-17 DIAGNOSIS — E78.5 HYPERLIPIDEMIA LDL GOAL <70: ICD-10-CM

## 2019-12-17 DIAGNOSIS — I25.10 CORONARY ARTERY DISEASE INVOLVING NATIVE CORONARY ARTERY OF NATIVE HEART WITHOUT ANGINA PECTORIS: ICD-10-CM

## 2019-12-17 DIAGNOSIS — I25.5 ISCHEMIC CARDIOMYOPATHY: Primary | ICD-10-CM

## 2019-12-17 DIAGNOSIS — I50.22 CHRONIC SYSTOLIC HF (HEART FAILURE) (HCC): ICD-10-CM

## 2019-12-17 DIAGNOSIS — I10 ESSENTIAL HYPERTENSION: ICD-10-CM

## 2019-12-17 PROCEDURE — 99214 OFFICE O/P EST MOD 30 MIN: CPT | Performed by: NURSE PRACTITIONER

## 2019-12-17 RX ORDER — NITROGLYCERIN 0.4 MG/1
0.4 TABLET SUBLINGUAL EVERY 5 MIN PRN
COMMUNITY
End: 2020-02-13 | Stop reason: SDUPTHER

## 2020-01-28 ENCOUNTER — TELEPHONE (OUTPATIENT)
Dept: CARDIOLOGY CLINIC | Age: 70
End: 2020-01-28

## 2020-01-31 DIAGNOSIS — I50.22 CHRONIC SYSTOLIC HF (HEART FAILURE) (HCC): ICD-10-CM

## 2020-01-31 DIAGNOSIS — I25.5 ISCHEMIC CARDIOMYOPATHY: ICD-10-CM

## 2020-01-31 DIAGNOSIS — E78.5 HYPERLIPIDEMIA LDL GOAL <70: ICD-10-CM

## 2020-01-31 LAB
A/G RATIO: 2.1 (ref 1.1–2.2)
ALBUMIN SERPL-MCNC: 4.6 G/DL (ref 3.4–5)
ALP BLD-CCNC: 69 U/L (ref 40–129)
ALT SERPL-CCNC: 17 U/L (ref 10–40)
ANION GAP SERPL CALCULATED.3IONS-SCNC: 13 MMOL/L (ref 3–16)
AST SERPL-CCNC: 16 U/L (ref 15–37)
BILIRUB SERPL-MCNC: 0.6 MG/DL (ref 0–1)
BUN BLDV-MCNC: 17 MG/DL (ref 7–20)
CALCIUM SERPL-MCNC: 9.7 MG/DL (ref 8.3–10.6)
CHLORIDE BLD-SCNC: 107 MMOL/L (ref 99–110)
CHOLESTEROL, TOTAL: 144 MG/DL (ref 0–199)
CO2: 25 MMOL/L (ref 21–32)
CREAT SERPL-MCNC: 0.5 MG/DL (ref 0.6–1.2)
GFR AFRICAN AMERICAN: >60
GFR NON-AFRICAN AMERICAN: >60
GLOBULIN: 2.2 G/DL
GLUCOSE BLD-MCNC: 95 MG/DL (ref 70–99)
HDLC SERPL-MCNC: 56 MG/DL (ref 40–60)
LDL CHOLESTEROL CALCULATED: 73 MG/DL
POTASSIUM SERPL-SCNC: 4.6 MMOL/L (ref 3.5–5.1)
SODIUM BLD-SCNC: 145 MMOL/L (ref 136–145)
TOTAL PROTEIN: 6.8 G/DL (ref 6.4–8.2)
TRIGL SERPL-MCNC: 77 MG/DL (ref 0–150)
VLDLC SERPL CALC-MCNC: 15 MG/DL

## 2020-02-06 ENCOUNTER — HOSPITAL ENCOUNTER (OUTPATIENT)
Dept: NON INVASIVE DIAGNOSTICS | Age: 70
Discharge: HOME | End: 2020-02-06
Payer: MEDICARE

## 2020-02-06 LAB
LV EF: 48 %
LVEF MODALITY: NORMAL

## 2020-02-06 PROCEDURE — 93306 TTE W/DOPPLER COMPLETE: CPT

## 2020-02-10 NOTE — PROGRESS NOTES
[facility]  Office Visit    Gwen Marks  1950 February 13, 2020    CC:   Chief Complaint   Patient presents with    Congestive Heart Failure     no cardiac complaints     HPI:  The patient is 69 y.o. female with a past medical history significant for CAD, HTN, HLP, CHF and ischemic cardiomyopathy (MI in 2006 with stent to LAD and Dg) who is here for follow up. Hospitalized from 10/14/2019-10/16/2019 with CHF. She presented with 1 day H/O SOB and called 911. Received IV lasix with improvement in SOB She has been followed here in the office since then and had been switched to [facility] with adjustment in the dose. Last seen on 12/16/2019 and repeat echo ordered for 2/2020. Her echo was recently completed and showed an improved LVEF and now at 45-50%. Overall feeling well. Continues to work 2 days a week. Denies chest pain/discomfort, SOB, orthopnea/PND, cough, palpitations, dizziness, syncope, edema , weight change or claudication. Always active and remains quite busy all day. Review of Systems:  Constitutional: Denies  fatigue, weakness, night sweats or fever. HEENT: Denies new visual changes, ringing in ears, nosebleeds,nasal congestion  Respiratory: Denies new or change in SOB, PND, orthopnea or cough. Cardiovascular: see HPI  GI: Denies N/V, diarrhea, constipation, abdominal pain, change in bowel habits, melena or hematochezia  : Denies urinary frequency, urgency, incontinence, hematuria or dysuria. Skin: Denies rash, hives, or cyanosis  Musculoskeletal: Denies joint or muscle aches/pain  Neurological: Denies syncope or TIA-like symptoms.   Psychiatric: Denies anxiety, insomnia or depression     Past Medical History:   Diagnosis Date    CAD (coronary artery disease)     HTN (hypertension)     Hyperlipidemia     Ischemic cardiomyopathy      Past Surgical History:   Procedure Laterality Date    CORONARY ANGIOPLASTY WITH STENT PLACEMENT       Family History Problem Relation Age of Onset    Diabetes Father     Heart Disease Father     Heart Attack Sister     Heart Attack Brother     Heart Surgery Brother      Social History     Tobacco Use    Smoking status: Never Smoker    Smokeless tobacco: Never Used   Substance Use Topics    Alcohol use: Yes     Comment: sOCIALLY    Drug use: Never       No Known Allergies  Current Outpatient Medications   Medication Sig Dispense Refill    nitroGLYCERIN (NITROSTAT) 0.4 MG SL tablet Place 1 tablet under the tongue every 5 minutes as needed for Chest pain up to max of 3 total doses. If no relief after 1 dose, call 911. 25 tablet 3    aspirin 81 MG chewable tablet Take 1 tablet by mouth daily 90 tablet 2    atorvastatin (LIPITOR) 40 MG tablet Take 1 tablet by mouth nightly 90 tablet 2    carvedilol (COREG) 6.25 MG tablet Take 1 tablet by mouth 2 times daily (with meals) 180 tablet 2    furosemide (LASIX) 20 MG tablet Take 1 tablet by mouth every other day 45 tablet 1    sacubitril-valsartan (ENTRESTO) 49-51 MG per tablet Take 1 tablet by mouth 2 times daily 60 tablet 3    sodium chloride (ALTAMIST SPRAY) 0.65 % nasal spray Apply to each side of nose 4-5x per day for the next 2 weeks. 1 Bottle 3     No current facility-administered medications for this visit. Physical Exam:   /78   Pulse 60   Ht 5' (1.524 m)   Wt 138 lb (62.6 kg)   SpO2 98%   BMI 26.95 kg/m²   Wt Readings from Last 2 Encounters:   02/13/20 138 lb (62.6 kg)   12/17/19 138 lb (62.6 kg)     Constitutional: She is oriented to person, place, and time. She appears well-developed and well-nourished. In no acute distress. HEENT: Normocephalic and atraumatic. Sclerae anicteric. No xanthelasmas. EOM's intact. Neck: Supple. No JVD present. Carotids without bruits. No thyromegaly present. Cardiovascular: RRR, normal S1 and S2; no murmur/gallop or rub  Pulmonary/Chest: Effort normal.  Lungs clear to auscultation.  Chest wall cusp.  It is a dominant vessel. It is patent with patent stents. 5.  LV ejection fraction is 35% with LVEDP of 10 mmHg. 10/15/2019 Echo:  Overall left ventricular systolic function appears moderately reduced  Ejection fraction is visually estimated to be 35-40%. There is mild diffuse  hypokinesis and severe hypokinesis/akinesis of the basal to mid latisha-lateral walls. Mildly dilated left ventricle. Normal left ventricular  wall thickness. Diastolic filling parameters suggest grade II diastolic  dysfunction.   Normal right ventricular size and function.   The left atrium is moderately dilated.   The right atrium is mildly dilated.   Mild to moderate mitral and tricuspid regurgitation.   Estimated pulmonary artery systolic pressure is mildly elevated at 40 mmHg  assuming a right atrial pressure of 3 mmHg. 1/2006: Angiogram/PCI  70% mid LAD and 100% Dg2 (TATO placed)  POBA RCA (residual 50%)    Echo 1/2006:  LVEF 45%; distal anterior, anterolateral and apical HK; mod TR    Assessment:    1. Ischemic cardiomyopathy  -LVEF 35% on echo in 10/2019; last echo in 2/2020 with improved LVEF and now 45-50%  -continue Entresto, carvedilol and statin  -no need for ICD therapy with improved LVEF    2. Chronic systolic HF (heart failure) (HCC)  -well compensated and NYHA class II  -continue medical management with lasix, Entresto, carvedilol  -not on aldactone d/t hypotension    3. Coronary artery disease involving native coronary artery of native heart without angina pectoris  -prior stents to LAD, Dg and RCA  -last cath in 10/2019 demonstrated patent stents  -no chest pain to suggest angina  -continue medical management with ASA, carvedilol and statin  -risk factor modification    4. Essential hypertension  -BP much better controlled  -goal BP < 130/80  -continue medical management    5.  Hyperlipidemia  LDL goal < 70  -last LDL 73  -continue high intensity statin    Plan:  Labs from 1/31/2020 reviewed  Echo results from 2/2020 reviewed with pt and her daughter: questions answered  Continue ASA, statin, carvedilol, lasix, Entresto  Discussed and reviewed low-fat/low sodium diet, monitoring of daily weights, fluid restriction, worsening signs and symptoms of heart failure and when to call, and the importance of regular exercise and activity. Follow up with Dr. Channing Pack in 4-5 months or sooner if needed    Return in about 4 months (around 6/13/2020) for 4-5 months with Dr. Channing Pack. Thanks for allowing me to participate in the care of this patient.       Renae Mariano, APRN-CNP  [address], [address] [place] [address]) [place], [address] [place] [address]  Jim Pereira [facility], [facility]  Office: [phone]  Fax: [phone]

## 2020-02-12 ENCOUNTER — TELEPHONE (OUTPATIENT)
Dept: CARDIOLOGY CLINIC | Age: 70
End: 2020-02-12

## 2020-02-12 NOTE — TELEPHONE ENCOUNTER
Roselie Balloon called, stating she received a call from Keyshawn Fuentes NP this am, but doesn't know what it is regarding. She is currently scheduled to come see Keyshawn Fuentes NP tomorrow.    Eli Zamora can be reached at [phone]

## 2020-02-13 ENCOUNTER — OFFICE VISIT (OUTPATIENT)
Dept: CARDIOLOGY CLINIC | Age: 70
End: 2020-02-13
Payer: MEDICARE

## 2020-02-13 VITALS
OXYGEN SATURATION: 98 % | HEART RATE: 60 BPM | DIASTOLIC BLOOD PRESSURE: 78 MMHG | SYSTOLIC BLOOD PRESSURE: 132 MMHG | WEIGHT: 138 LBS | HEIGHT: 60 IN | BODY MASS INDEX: 27.09 KG/M2

## 2020-02-13 PROCEDURE — 99213 OFFICE O/P EST LOW 20 MIN: CPT | Performed by: NURSE PRACTITIONER

## 2020-02-13 RX ORDER — CARVEDILOL 6.25 MG/1
6.25 TABLET ORAL 2 TIMES DAILY WITH MEALS
Qty: 180 TABLET | Refills: 2 | Status: SHIPPED | OUTPATIENT
Start: 2020-02-13 | End: 2020-12-08

## 2020-02-13 RX ORDER — ASPIRIN 81 MG/1
81 TABLET, CHEWABLE ORAL DAILY
Qty: 90 TABLET | Refills: 2 | Status: SHIPPED | OUTPATIENT
Start: 2020-02-13 | End: 2020-12-08

## 2020-02-13 RX ORDER — ATORVASTATIN CALCIUM 40 MG/1
40 TABLET, FILM COATED ORAL NIGHTLY
Qty: 90 TABLET | Refills: 2 | Status: SHIPPED | OUTPATIENT
Start: 2020-02-13 | End: 2020-12-08

## 2020-02-13 RX ORDER — FUROSEMIDE 20 MG/1
20 TABLET ORAL EVERY OTHER DAY
Qty: 45 TABLET | Refills: 1 | Status: SHIPPED | OUTPATIENT
Start: 2020-02-13 | End: 2020-12-16 | Stop reason: SDUPTHER

## 2020-02-13 RX ORDER — FUROSEMIDE 20 MG/1
20 TABLET ORAL EVERY OTHER DAY
COMMUNITY
End: 2020-02-13 | Stop reason: SDUPTHER

## 2020-02-13 RX ORDER — NITROGLYCERIN 0.4 MG/1
0.4 TABLET SUBLINGUAL EVERY 5 MIN PRN
Qty: 25 TABLET | Refills: 3 | Status: SHIPPED | OUTPATIENT
Start: 2020-02-13

## 2020-03-18 ENCOUNTER — TELEPHONE (OUTPATIENT)
Dept: CARDIOLOGY CLINIC | Age: 70
End: 2020-03-18

## 2020-03-18 RX ORDER — SACUBITRIL AND VALSARTAN 49; 51 MG/1; MG/1
1 TABLET, FILM COATED ORAL 2 TIMES DAILY
Qty: 56 TABLET | Refills: 3 | COMMUNITY
Start: 2020-03-18 | End: 2020-07-16

## 2020-03-18 NOTE — TELEPHONE ENCOUNTER
Medication Samples    Medication:Entresto    Doseage of the medication:49-51 mg per Tablet    How are you taking this medication (QD, BID, TID, QID, PRN): Take 1 tablet by mouth 2 times daily.  in the office or Mail to your home?     Please mail to:   Gaye [address] can be reached at [phone]

## 2020-06-25 ENCOUNTER — OFFICE VISIT (OUTPATIENT)
Dept: CARDIOLOGY CLINIC | Age: 70
End: 2020-06-25
Payer: MEDICARE

## 2020-06-25 VITALS
SYSTOLIC BLOOD PRESSURE: 128 MMHG | BODY MASS INDEX: 27.72 KG/M2 | WEIGHT: 141.2 LBS | OXYGEN SATURATION: 98 % | HEART RATE: 64 BPM | HEIGHT: 60 IN | TEMPERATURE: 98.5 F | DIASTOLIC BLOOD PRESSURE: 78 MMHG

## 2020-06-25 PROCEDURE — 99214 OFFICE O/P EST MOD 30 MIN: CPT | Performed by: INTERNAL MEDICINE

## 2020-06-25 NOTE — PROGRESS NOTES
[address]  Cardiology Consult Note      Fonda Duverney  1950, 69 y.o. Reason for visit: CAD, CHF  CC: \"things are good. \"            Du Giraldo MD:    HPI:   This is a 69 y.o. female with a history of CAD (prior MI with PCI 2006), HTN, HLD, and CHF who presents today for management of heart failure and CAD. She was admitted to [facility] 10/2019 and was admitted with a CHF exacerbation. She was diureses with Lasix and noted improvement. She was started on Entresto and her repeat echocardiogram showed an improved EF to 45-50%. Today, she states she is doing well and denies any new cardiac complaints. She denies any chest pains, worsening shortness of breath, LE edema or weight gain. She reports compliance with her medications and tolerating. Patient denies exertional chest pain/pressure, dyspnea at rest, LINDQUIST, PND, orthopnea, palpitations, lightheadedness, weight changes, changes in LE edema, and syncope.     Past Medical History:   Diagnosis Date    CAD (coronary artery disease)     HTN (hypertension)     Hyperlipidemia     Ischemic cardiomyopathy       Past Surgical History:   Procedure Laterality Date    CORONARY ANGIOPLASTY WITH STENT PLACEMENT        Family History   Problem Relation Age of Onset    Diabetes Father     Heart Disease Father     Heart Attack Sister     Heart Attack Brother     Heart Surgery Brother       Social History     Tobacco Use    Smoking status: Never Smoker    Smokeless tobacco: Never Used   Substance Use Topics    Alcohol use: Yes     Comment: sOCIALLY    Drug use: Never     No Known Allergies  Review of Systems -   Constitutional: Negative for weight gain/loss; malaise, fever  Respiratory: Negative for Asthma;  cough and hemoptysis  Cardiovascular: Negative for palpitations,dizziness   Gastrointestinal: Negative for abd.pain; constipation/diarrhea;    Genitourinary: Negative for stones; hematuria; frequency hesitancy  Integumentt: Negative for rash or pruritis  Hematologic/lymphatic: Negative for blood dyscrasia; leukemia/lymphoma  Musculoskeletal: Negative for Connective tissue disease  Neurological:  Negative for Seizure   Behavioral/Psych:Negative for Bipolar disorder, Schizophrenia; Dementia  Endocrine: negative for thyroid, parathyroid disease    Physical Examination:    /78   Pulse 64   Temp 98.5 °F (36.9 °C)   Ht 5' (1.524 m)   Wt 141 lb 3.2 oz (64 kg)   SpO2 98%   BMI 27.58 kg/m²      HEENT:  Face: Atraumatic, Conjunctiva: Pink; non icteric,  Mucous Memb:  Moist, No thyromegaly or Lymphadenopathy  Respiratory:  Resp Assessment: WNL, Resp Auscultation: WNL   Cardiovascular: Auscultation: nl S1 & S2, Palpation:  Nl PMI; No heaves or thrills, JVP:  normal  Abdomen: Soft, non-tender, Normal bowel sounds,  No organomegaly  Extremities: No Cyanosis or Clubbing  Neurological: Oriented to time, place, and person, Non-anxious  Psychiatric: Normal mood and affect  Skin: Warm and dry,  No rash seen     Outpatient Medications Marked as Taking for the 6/25/20 encounter (Office Visit) with Eliel Cardona MD   Medication Sig Dispense Refill    sacubitril-valsartan (ENTRESTO) 49-51 MG per tablet Take 1 tablet by mouth 2 times daily 56 tablet 3    nitroGLYCERIN (NITROSTAT) 0.4 MG SL tablet Place 1 tablet under the tongue every 5 minutes as needed for Chest pain up to max of 3 total doses. If no relief after 1 dose, call 911. 25 tablet 3    aspirin 81 MG chewable tablet Take 1 tablet by mouth daily 90 tablet 2    atorvastatin (LIPITOR) 40 MG tablet Take 1 tablet by mouth nightly 90 tablet 2    carvedilol (COREG) 6.25 MG tablet Take 1 tablet by mouth 2 times daily (with meals) 180 tablet 2    furosemide (LASIX) 20 MG tablet Take 1 tablet by mouth every other day 45 tablet 1    sodium chloride (ALTAMIST SPRAY) 0.65 % nasal spray Apply to each side of nose 4-5x per day for the next 2 weeks.  1 Bottle 3       Lab Results   Component Value Date    HDL 56 01/31/2020    LDLCALC 73 01/31/2020    TRIG 77 01/31/2020     EKG: no EKG today      ECHO 10/14/19  Overall left ventricular systolic function appears moderately reduced. Ejection fraction is visually estimated to be 35-40%. There is mild diffuse  hypokinesis and severe hypokinesis/akinesis of the basal to mid  latisha-lateral walls. Mildly dilated left ventricle. Normal left ventricular  wall thickness. Diastolic filling parameters suggest grade II diastolic dysfunction. Normal right ventricular size and function. The left atrium is moderately dilated. The right atrium is mildly dilated. Mild to moderate mitral and tricuspid regurgitation. Estimated pulmonary artery systolic pressure is mildly elevated at 40 mmHg assuming a right atrial pressure of 3 mmHg. ECHO: 2/6/20  Overall left ventricular systolic function appears mildly reduced. Ejection fraction is visually estimated to be 45-50%. Endocardial definition is poor but there may be regional variations with more prominent lateral hypokinesis. Normal left ventricular wall thickness and cavity size. Diastolic filling parameters suggest grade I diastolic dysfunction. Normal right ventricular size and function. The left atrium is moderately dilated. The right atrium is mildly dilated. Mild tricuspid regurgitation  Trivial mitral regurgitation. Estimated pulmonary artery systolic pressure is normal at 23 mmHg assuming a right atrial pressure of 3 mmHg. Cath 1/2006  Successful angioplasty followed by stent deployment in the mid LAD as well as   the diagonal branches in crush technique. 100% occlusion of the diagonal branch was   reduced to 0% using 2.5 stent. 60-70% stenosis of the mid LAD reduced to 0% using 2.75   stent with a final diameter of 3.01. [facility] angiogram  & Intervention: 10/17/19   1. Left main is normal.  CASIE 3 flow. No stenosis. 2.  Left anterior descending artery is patent with patent stent with  patent diagonal branches. LEG

## 2020-06-25 NOTE — PATIENT INSTRUCTIONS
Patient Education        Low Sodium Diet (2,000 Milligram): Care Instructions  Your Care Instructions     Too much sodium causes your body to hold on to extra water. This can raise your blood pressure and force your heart and kidneys to work harder. In very serious cases, this could cause you to be put in the hospital. It might even be life-threatening. By limiting sodium, you will feel better and lower your risk of serious problems. The most common source of sodium is salt. People get most of the salt in their diet from canned, prepared, and packaged foods. Fast food and restaurant meals also are very high in sodium. Your doctor will probably limit your sodium to less than 2,000 milligrams (mg) a day. This limit counts all the sodium in prepared and packaged foods and any salt you add to your food. Follow-up care is a key part of your treatment and safety. Be sure to make and go to all appointments, and call your doctor if you are having problems. It's also a good idea to know your test results and keep a list of the medicines you take. How can you care for yourself at home? Read food labels  · Read labels on cans and food packages. The labels tell you how much sodium is in each serving. Make sure that you look at the serving size. If you eat more than the serving size, you have eaten more sodium. · Food labels also tell you the Percent Daily Value for sodium. Choose products with low Percent Daily Values for sodium. · Be aware that sodium can come in forms other than salt, including monosodium glutamate (MSG), sodium citrate, and sodium bicarbonate (baking soda). MSG is often added to Asian food. When you eat out, you can sometimes ask for food without MSG or added salt. Buy low-sodium foods  · Buy foods that are labeled \"unsalted\" (no salt added), \"sodium-free\" (less than 5 mg of sodium per serving), or \"low-sodium\" (less than 140 mg of sodium per serving).  Foods labeled \"reduced-sodium\" and \"light sodium\" may still have too much sodium. Be sure to read the label to see how much sodium you are getting. · Buy fresh vegetables, or frozen vegetables without added sauces. Buy low-sodium versions of canned vegetables, soups, and other canned goods. Prepare low-sodium meals  · Cut back on the amount of salt you use in cooking. This will help you adjust to the taste. Do not add salt after cooking. One teaspoon of salt has about 2,300 mg of sodium. · Take the salt shaker off the table. · Flavor your food with garlic, lemon juice, onion, vinegar, herbs, and spices. Do not use soy sauce, lite soy sauce, steak sauce, onion salt, garlic salt, celery salt, mustard, or ketchup on your food. · Use low-sodium salad dressings, sauces, and ketchup. Or make your own salad dressings and sauces without adding salt. · Use less salt (or none) when recipes call for it. You can often use half the salt a recipe calls for without losing flavor. Other foods such as rice, pasta, and grains do not need added salt. · Rinse canned vegetables, and cook them in fresh water. This removes some--but not all--of the salt. · Avoid water that is naturally high in sodium or that has been treated with water softeners, which add sodium. Call your local water company to find out the sodium content of your water supply. If you buy bottled water, read the label and choose a sodium-free brand. Avoid high-sodium foods  · Avoid eating:  ? Smoked, cured, salted, and canned meat, fish, and poultry. ? Ham, marroquin, hot dogs, and luncheon meats. ? Regular, hard, and processed cheese and regular peanut butter. ? Crackers with salted tops, and other salted snack foods such as pretzels, chips, and salted popcorn. ? Frozen prepared meals, unless labeled low-sodium. ? Canned and dried soups, broths, and bouillon, unless labeled sodium-free or low-sodium. ? Canned vegetables, unless labeled sodium-free or low-sodium. ?  Western Aaliyah fries, pizza, tacos, and other fast foods. ? Pickles, olives, ketchup, and other condiments, especially soy sauce, unless labeled sodium-free or low-sodium. Where can you learn more? Go to https://[employer] ([employer]). org and sign in to your [employer] account. Enter X356 in the formerly [facility] box to learn more about \"Low Sodium Diet (2,000 Milligram): Care Instructions. \"     If you do not have an account, please click on the \"Sign Up Now\" link. Current as of: August 22, 2019               Content Version: 12.5  © 7991-9435 [employer]wise, Incorporated. Care instructions adapted under license by [facility] [address] If you have questions about a medical condition or this instruction, always ask your healthcare professional. [address] any warranty or liability for your use of this information.

## 2020-07-16 RX ORDER — SACUBITRIL AND VALSARTAN 49; 51 MG/1; MG/1
TABLET, FILM COATED ORAL
Qty: 60 TABLET | Refills: 5 | Status: SHIPPED | OUTPATIENT
Start: 2020-07-16 | End: 2020-10-30 | Stop reason: SDUPTHER

## 2020-10-29 ENCOUNTER — TELEPHONE (OUTPATIENT)
Dept: CARDIOLOGY CLINIC | Age: 70
End: 2020-10-29

## 2020-10-29 NOTE — TELEPHONE ENCOUNTER
Medication Samples    Medication:ENTRESTO     Dosage of the medication:49-51 ,g    How are you taking this medication (QD, BID, TID, QID, PRN):TAKE ONE TABLET BY MOUTH TWICE A DAY      in the office or Mail to your home?      [place] # [phone]

## 2020-10-30 RX ORDER — SACUBITRIL AND VALSARTAN 49; 51 MG/1; MG/1
TABLET, FILM COATED ORAL
Qty: 36 TABLET | Refills: 0 | COMMUNITY
Start: 2020-10-30 | End: 2021-04-26 | Stop reason: SDUPTHER

## 2020-12-08 RX ORDER — ATORVASTATIN CALCIUM 40 MG/1
TABLET, FILM COATED ORAL
Qty: 90 TABLET | Refills: 1 | Status: SHIPPED | OUTPATIENT
Start: 2020-12-08 | End: 2020-12-16 | Stop reason: SDUPTHER

## 2020-12-08 RX ORDER — CARVEDILOL 6.25 MG/1
TABLET ORAL
Qty: 180 TABLET | Refills: 1 | Status: SHIPPED | OUTPATIENT
Start: 2020-12-08 | End: 2020-12-16 | Stop reason: SDUPTHER

## 2020-12-08 RX ORDER — ASPIRIN 81 MG
TABLET,CHEWABLE ORAL
Qty: 90 TABLET | Refills: 1 | Status: SHIPPED | OUTPATIENT
Start: 2020-12-08 | End: 2020-12-16 | Stop reason: SDUPTHER

## 2020-12-14 NOTE — PROGRESS NOTES
Sister     Heart Attack Brother     Heart Surgery Brother      Social History     Tobacco Use    Smoking status: Never Smoker    Smokeless tobacco: Never Used   Substance Use Topics    Alcohol use: Yes     Comment: sOCIALLY    Drug use: Never       No Known Allergies  Current Outpatient Medications   Medication Sig Dispense Refill    furosemide (LASIX) 20 MG tablet Take 1 tablet by mouth every other day 45 tablet 2    carvedilol (COREG) 6.25 MG tablet Take 1 tablet by mouth 2 times daily 180 tablet 2    atorvastatin (LIPITOR) 40 MG tablet Take 1 tablet by mouth daily 90 tablet 2    aspirin (ASPIRIN LOW DOSE) 81 MG chewable tablet Take 1 tablet by mouth daily 90 tablet 2    sacubitril-valsartan (ENTRESTO) 49-51 MG per tablet TAKE ONE TABLET BY MOUTH TWICE A DAY 36 tablet 0    nitroGLYCERIN (NITROSTAT) 0.4 MG SL tablet Place 1 tablet under the tongue every 5 minutes as needed for Chest pain up to max of 3 total doses. If no relief after 1 dose, call 911. 25 tablet 3     No current facility-administered medications for this visit. Physical Exam:   /72 Comment: on repeat  Pulse 65   Temp 98.2 °F (36.8 °C)   Ht 5' (1.524 m)   Wt 148 lb 12.8 oz (67.5 kg)   SpO2 100%   BMI 29.06 kg/m²   Wt Readings from Last 2 Encounters:   12/16/20 148 lb 12.8 oz (67.5 kg)   06/25/20 141 lb 3.2 oz (64 kg)     Constitutional: She is oriented to person, place, and time. She appears well-developed and well-nourished. In no acute distress. HEENT: Normocephalic and atraumatic. Sclerae anicteric. No xanthelasmas. EOM's intact. Neck: Supple. No JVD present. Carotids without bruits. No thyromegaly present. No lymphadenopathy present. Cardiovascular: RRR, normal S1 and S2; no murmur/gallop or rub  Pulmonary/Chest: Effort normal.  Lungs clear to auscultation. Chest wall nontender  Abdominal: soft, nontender, nondistended. + bowel sounds; no hepatomegaly Extremities: No edema, cyanosis, or clubbing.  Pulses are 2+ radial/DP/PT  bilaterally. Cap refill brisk. Neurological: No focal deficit. Skin: Skin is warm and dry. Psychiatric: She has a normal mood and affect. Her speech is normal and behavior is normal.     Lab Review:   Lab Results   Component Value Date    TRIG 77 01/31/2020    HDL 56 01/31/2020    LDLCALC 73 01/31/2020    LABVLDL 15 01/31/2020     Lab Results   Component Value Date     01/31/2020    K 4.6 01/31/2020    K 3.8 10/14/2019     01/31/2020    CO2 25 01/31/2020    BUN 17 01/31/2020    CREATININE 0.5 01/31/2020    GLUCOSE 95 01/31/2020    CALCIUM 9.7 01/31/2020      Lab Results   Component Value Date    WBC 9.1 10/14/2019    HGB 14.7 10/14/2019    HCT 44.6 10/14/2019    MCV 91.5 10/14/2019     10/14/2019     2/6/2020 Echo:   Poor image quality.   Overall left ventricular systolic function appears mildly reduced. Ejection  fraction is visually estimated to be 45-50%. Endocardial definition is poor  but there may be regional variations with more prominent lateral  hypokinesis. Normal left ventricular wall thickness and cavity size.   Diastolic filling parameters suggest grade I diastolic dysfunction.   Normal right ventricular size and function.   The left atrium is moderately dilated.   The right atrium is mildly dilated.   Mild tricuspid regurgitation.   Trivial mitral regurgitation.   Estimated pulmonary artery systolic pressure is normal at 23 mmHg assuming a  right atrial pressure of 3 mmHg.     10/16/2019: Coronary angiogram:  1.  Left main is normal.  CASIE 3 flow.  No stenosis. 2.  Left anterior descending artery is patent with patent stent with  patent diagonal branches.  Diagonal branch has a stent present which is patent. 3.  Left circumflex is patent.  Patent obtuse marginal branches. 4.  Right coronary artery comes from the right coronary cusp.  It is a dominant vessel.  It is patent with patent stents.   5.  LV ejection fraction is 35% with LVEDP of 10 mmHg.     10/15/2019 Echo:  Overall left ventricular systolic function appears moderately reduced  Ejection fraction is visually estimated to be 35-40%. There is mild diffuse  hypokinesis and severe hypokinesis/akinesis of the basal to mid latisha-lateral walls. Mildly dilated left ventricle. Normal left ventricular  wall thickness. Diastolic filling parameters suggest grade II diastolic  dysfunction.   Normal right ventricular size and function.   The left atrium is moderately dilated.   The right atrium is mildly dilated.   Mild to moderate mitral and tricuspid regurgitation.   Estimated pulmonary artery systolic pressure is mildly elevated at 40 mmHg  assuming a right atrial pressure of 3 mmHg.     1/2006: Angiogram/PCI  70% mid LAD and 100% Dg2 (TATO placed)  POBA RCA (residual 50%)     Echo 1/2006:  LVEF 45%; distal anterior, anterolateral and apical HK; mod TR    Assessment:      1. Chronic systolic heart failure (HCC)  -currently compensated and NYHA class II  -continue medical management with lasix, Entresto and carvedilol  -no need for ICD therapy with improved LVEF    2. Ischemic cardiomyopathy  -LVEF 45-50% on echo in February 2020 (improved from 35% on prior echo)  -continue Entresto, carvedilol and statin  -no aldactone given her improved LVEF   -not an ICD candidate with improved LVEF    3. Coronary artery disease involving native coronary artery of native heart without angina pectoris  -prior stents to LAD, Dg, and RCA  -last cath in 10/2019 demonstrated patent stents  -no recurrent angina  -continue medical management with ASA, carvedilol and statin  -risk factor modification    4. Hyperlipidemia LDL goal <70  -continue atorvastatin  -recheck lipids in January 2021    5.  Essential hypertension  -goal BP < 130/80  -@ goal BP  -continue medical management    Plan:  Check labs in January 2021: CMP, lipids  Continue Entresto, lasix, carvedilol, statin and ASA  Emphasized and reinforced low-fat/low sodium diet, monitoring of daily weights, fluid restriction, worsening signs and symptoms of heart failure and when to call, and the importance of regular exercise and activity. Follow up with D. Enzweiler, CNP in 6 months or sooner if needed    Return in about 6 months (around 6/16/2021) for with D. Enzweiler, CNP. Thanks for allowing me to participate in the care of this patient.       RAMY López  [facility], [address] [place] [address] [address] [place] [address], [address] [place] [address]  Office: [phone]  Fax: [phone]      Electronically signed by RUDDY Abreu CNP on 12/16/2020 at 11:34 AM

## 2020-12-16 ENCOUNTER — OFFICE VISIT (OUTPATIENT)
Dept: CARDIOLOGY CLINIC | Age: 70
End: 2020-12-16
Payer: MEDICARE

## 2020-12-16 VITALS
SYSTOLIC BLOOD PRESSURE: 120 MMHG | WEIGHT: 148.8 LBS | DIASTOLIC BLOOD PRESSURE: 72 MMHG | HEART RATE: 65 BPM | TEMPERATURE: 98.2 F | OXYGEN SATURATION: 100 % | HEIGHT: 60 IN | BODY MASS INDEX: 29.21 KG/M2

## 2020-12-16 PROCEDURE — 99214 OFFICE O/P EST MOD 30 MIN: CPT | Performed by: NURSE PRACTITIONER

## 2020-12-16 RX ORDER — FUROSEMIDE 20 MG/1
20 TABLET ORAL EVERY OTHER DAY
Qty: 45 TABLET | Refills: 2 | Status: SHIPPED | OUTPATIENT
Start: 2020-12-16 | End: 2021-11-15 | Stop reason: SDUPTHER

## 2020-12-16 RX ORDER — ASPIRIN 81 MG/1
81 TABLET, CHEWABLE ORAL DAILY
Qty: 90 TABLET | Refills: 2 | Status: SHIPPED | OUTPATIENT
Start: 2020-12-16 | End: 2022-03-07

## 2020-12-16 RX ORDER — CARVEDILOL 6.25 MG/1
6.25 TABLET ORAL 2 TIMES DAILY
Qty: 180 TABLET | Refills: 2 | Status: SHIPPED | OUTPATIENT
Start: 2020-12-16 | End: 2021-11-15 | Stop reason: SDUPTHER

## 2020-12-16 RX ORDER — ATORVASTATIN CALCIUM 40 MG/1
40 TABLET, FILM COATED ORAL DAILY
Qty: 90 TABLET | Refills: 2 | Status: SHIPPED | OUTPATIENT
Start: 2020-12-16 | End: 2021-04-27

## 2021-03-23 NOTE — TELEPHONE ENCOUNTER
Medication Samples     Medication:Entresto     Doseage of the medication:49-51 mg per Tablet     How are you taking this medication (QD, BID, TID, QID, PRN): Take 1 tablet by mouth 2 times daily.      in the office or Mail to your home?     Delmer Cage [address] called in this afternoon for entresto samples. She has asked if she can get a couple months worth.     Gala Mcdonough can be reached at [phone]

## 2021-04-26 ENCOUNTER — TELEPHONE (OUTPATIENT)
Dept: CARDIOLOGY CLINIC | Age: 71
End: 2021-04-26

## 2021-04-26 DIAGNOSIS — I25.5 ISCHEMIC CARDIOMYOPATHY: ICD-10-CM

## 2021-04-26 DIAGNOSIS — I10 ESSENTIAL HYPERTENSION: Primary | ICD-10-CM

## 2021-04-26 DIAGNOSIS — I50.22 CHRONIC SYSTOLIC HEART FAILURE (HCC): ICD-10-CM

## 2021-04-26 RX ORDER — SACUBITRIL AND VALSARTAN 49; 51 MG/1; MG/1
TABLET, FILM COATED ORAL
Qty: 36 TABLET | Refills: 0 | Status: SHIPPED | OUTPATIENT
Start: 2021-04-26 | End: 2021-04-28 | Stop reason: SDUPTHER

## 2021-04-26 NOTE — TELEPHONE ENCOUNTER
We currently do not have samples of entresto. Could not leave a message due to her mailbox full. If patient calls back please advise her.

## 2021-04-26 NOTE — TELEPHONE ENCOUNTER
Medication Refill    Medication needing refilled:  Entresto    Dosage of the medication:  49-51 MG per tablet       How are you taking this medication (QD, BID, TID, QID, PRN):  TAKE ONE TABLET BY MOUTH TWICE A DAY    30 or 90 day supply called in:  30 day supply  When will you run out of your medication:  2 days left    Which Pharmacy are we sending the medication to?:  [address] [place] [address] [phone] [address] [address]   Phone:  [phone]  Fax:  [phone]

## 2021-04-27 DIAGNOSIS — E78.5 HYPERLIPIDEMIA LDL GOAL <70: ICD-10-CM

## 2021-04-27 DIAGNOSIS — I25.5 ISCHEMIC CARDIOMYOPATHY: ICD-10-CM

## 2021-04-27 DIAGNOSIS — E78.5 HYPERLIPIDEMIA LDL GOAL <70: Primary | ICD-10-CM

## 2021-04-27 DIAGNOSIS — I25.10 CORONARY ARTERY DISEASE INVOLVING NATIVE CORONARY ARTERY OF NATIVE HEART WITHOUT ANGINA PECTORIS: ICD-10-CM

## 2021-04-27 DIAGNOSIS — I50.22 CHRONIC SYSTOLIC HEART FAILURE (HCC): ICD-10-CM

## 2021-04-27 DIAGNOSIS — I10 ESSENTIAL HYPERTENSION: ICD-10-CM

## 2021-04-27 LAB
A/G RATIO: 2.1 (ref 1.1–2.2)
ALBUMIN SERPL-MCNC: 4.8 G/DL (ref 3.4–5)
ALP BLD-CCNC: 71 U/L (ref 40–129)
ALT SERPL-CCNC: 25 U/L (ref 10–40)
ANION GAP SERPL CALCULATED.3IONS-SCNC: 12 MMOL/L (ref 3–16)
AST SERPL-CCNC: 22 U/L (ref 15–37)
BILIRUB SERPL-MCNC: 0.8 MG/DL (ref 0–1)
BUN BLDV-MCNC: 12 MG/DL (ref 7–20)
CALCIUM SERPL-MCNC: 9.5 MG/DL (ref 8.3–10.6)
CHLORIDE BLD-SCNC: 108 MMOL/L (ref 99–110)
CHOLESTEROL, TOTAL: 171 MG/DL (ref 0–199)
CO2: 25 MMOL/L (ref 21–32)
CREAT SERPL-MCNC: 0.6 MG/DL (ref 0.6–1.2)
GFR AFRICAN AMERICAN: >60
GFR NON-AFRICAN AMERICAN: >60
GLOBULIN: 2.3 G/DL
GLUCOSE BLD-MCNC: 101 MG/DL (ref 70–99)
HDLC SERPL-MCNC: 59 MG/DL (ref 40–60)
LDL CHOLESTEROL CALCULATED: 82 MG/DL
POTASSIUM SERPL-SCNC: 4.2 MMOL/L (ref 3.5–5.1)
SODIUM BLD-SCNC: 145 MMOL/L (ref 136–145)
TOTAL PROTEIN: 7.1 G/DL (ref 6.4–8.2)
TRIGL SERPL-MCNC: 149 MG/DL (ref 0–150)
VLDLC SERPL CALC-MCNC: 30 MG/DL

## 2021-04-27 RX ORDER — ATORVASTATIN CALCIUM 80 MG/1
40 TABLET, FILM COATED ORAL DAILY
Qty: 90 TABLET | Refills: 3 | Status: SHIPPED | OUTPATIENT
Start: 2021-04-27 | End: 2021-06-16 | Stop reason: SDUPTHER

## 2021-04-29 RX ORDER — SACUBITRIL AND VALSARTAN 49; 51 MG/1; MG/1
TABLET, FILM COATED ORAL
Qty: 60 TABLET | Refills: 5 | Status: SHIPPED | OUTPATIENT
Start: 2021-04-29 | End: 2021-07-21 | Stop reason: SDUPTHER

## 2021-06-14 NOTE — PROGRESS NOTES
[address]  Office Visit    Marielos Menezes  1950 June 16, 2021    CC:   Chief Complaint   Patient presents with    Follow-up     no cc     HPI:  The patient is 66 y.o. female with a past medical history significant for CAD, HTN, HLP, CHF and ischemic cardiomyopathy (MI in 2006 with stent to LAD and Dg) who is here for follow up. Hospitalized from 10/14/2019-10/16/2019 with CHF. Echo performed in 2/2020 showed LVEF 45-50%. Has been following here in office and here for follow up of her CHF. Last seen in December 2020 at which time she was stable and  No medications changes made. She is here for 6 month F/U of her CHF, CAD and HTN. Overall feeling well. Dealing with stress of terminally ill sister. Denies chest pain/discomfort, SOB, orthopnea/PND, cough, palpitations, dizziness, syncope, edema , weight change or claudication. Remains very active    Review of Systems:  Constitutional: Denies  fatigue, weakness, night sweats or fever. HEENT: Denies new visual changes, ringing in ears, nosebleeds,nasal congestion  Respiratory: Denies new or change in SOB, PND, orthopnea or cough. Cardiovascular: see HPI  GI: Denies N/V, diarrhea, constipation, abdominal pain, change in bowel habits, melena or hematochezia  : Denies urinary frequency, urgency, incontinence, hematuria or dysuria. Skin: Denies rash, hives, or cyanosis  Musculoskeletal: Denies joint or muscle aches/pain  Neurological: Denies syncope or TIA-like symptoms.   Psychiatric: Denies anxiety, insomnia or depression       Past Medical History:   Diagnosis Date    CAD (coronary artery disease)     HTN (hypertension)     Hyperlipidemia     Ischemic cardiomyopathy      Past Surgical History:   Procedure Laterality Date    CORONARY ANGIOPLASTY WITH STENT PLACEMENT       Family History   Problem Relation Age of Onset    Diabetes Father     Heart Disease Father     Heart Attack Sister     Heart Attack Brother     Heart Surgery Brother      Social History     Tobacco Use    Smoking status: Never Smoker    Smokeless tobacco: Never Used   Vaping Use    Vaping Use: Never used   Substance Use Topics    Alcohol use: Yes     Comment: sOCIALLY    Drug use: Never       No Known Allergies  Current Outpatient Medications   Medication Sig Dispense Refill    atorvastatin (LIPITOR) 80 MG tablet Take 1 tablet by mouth daily 90 tablet 3    sacubitril-valsartan (ENTRESTO) 49-51 MG per tablet TAKE ONE TABLET BY MOUTH TWICE A DAY 60 tablet 5    furosemide (LASIX) 20 MG tablet Take 1 tablet by mouth every other day 45 tablet 2    carvedilol (COREG) 6.25 MG tablet Take 1 tablet by mouth 2 times daily 180 tablet 2    aspirin (ASPIRIN LOW DOSE) 81 MG chewable tablet Take 1 tablet by mouth daily 90 tablet 2    nitroGLYCERIN (NITROSTAT) 0.4 MG SL tablet Place 1 tablet under the tongue every 5 minutes as needed for Chest pain up to max of 3 total doses. If no relief after 1 dose, call 911. 25 tablet 3     No current facility-administered medications for this visit. Physical Exam:   /82   Pulse 79   Ht 5' (1.524 m)   Wt 152 lb (68.9 kg)   SpO2 100%   BMI 29.69 kg/m²   Wt Readings from Last 2 Encounters:   06/16/21 152 lb (68.9 kg)   12/16/20 148 lb 12.8 oz (67.5 kg)     Constitutional: She is oriented to person, place, and time. She appears well-developed and well-nourished. In no acute distress. HEENT: Normocephalic and atraumatic. Sclerae anicteric. No xanthelasmas. EOM's intact. Neck: Supple. No JVD present. Carotids without bruits. No thyromegaly present. No lymphadenopathy present. Cardiovascular: RRR, normal S1 and S2; no murmur/gallop or rub  Pulmonary/Chest: Effort normal.  Lungs clear to auscultation. Chest wall nontender  Abdominal: soft, nontender, nondistended. + bowel sounds; no hepatomegaly Extremities: No edema, cyanosis, or clubbing. Pulses are 2+ radial/carotid/DP/PT bilaterally.  Cap refill brisk.  Neurological: No focal deficit. Skin: Skin is warm and dry. Psychiatric: She has a normal mood and affect. Her speech is normal and behavior is normal.      Lab Review:   Lab Results   Component Value Date    TRIG 149 04/27/2021    HDL 59 04/27/2021    LDLCALC 82 04/27/2021    LABVLDL 30 04/27/2021     Lab Results   Component Value Date     04/27/2021    K 4.2 04/27/2021    K 3.8 10/14/2019     04/27/2021    CO2 25 04/27/2021    BUN 12 04/27/2021    CREATININE 0.6 04/27/2021    GLUCOSE 101 04/27/2021    CALCIUM 9.5 04/27/2021      Lab Results   Component Value Date    WBC 9.1 10/14/2019    HGB 14.7 10/14/2019    HCT 44.6 10/14/2019    MCV 91.5 10/14/2019     10/14/2019     ECG 6/16/2021:  Sinus rhythm with nonspecific ST-T changes; old lateral infarct    2/6/2020 Echo:   Poor image quality.   Overall left ventricular systolic function appears mildly reduced. Ejection  fraction is visually estimated to be 45-50%. Endocardial definition is poor  but there may be regional variations with more prominent lateral  hypokinesis. Normal left ventricular wall thickness and cavity size.   Diastolic filling parameters suggest grade I diastolic dysfunction.   Normal right ventricular size and function.   The left atrium is moderately dilated.   The right atrium is mildly dilated.   Mild tricuspid regurgitation.   Trivial mitral regurgitation.   Estimated pulmonary artery systolic pressure is normal at 23 mmHg assuming a  right atrial pressure of 3 mmHg.     10/16/2019: Coronary angiogram:  1.  Left main is normal.  CASIE 3 flow.  No stenosis. 2.  Left anterior descending artery is patent with patent stent with  patent diagonal branches.  Diagonal branch has a stent present which is patent. 3.  Left circumflex is patent.  Patent obtuse marginal branches. 4.  Right coronary artery comes from the right coronary cusp.  It is a dominant vessel.  It is patent with patent stents.   5.  LV ejection fraction is 35% with LVEDP of 10 mmHg.     10/15/2019 Echo:  Overall left ventricular systolic function appears moderately reduced  Ejection fraction is visually estimated to be 35-40%. There is mild diffuse  hypokinesis and severe hypokinesis/akinesis of the basal to mid latisha-lateral walls. Mildly dilated left ventricle. Normal left ventricular  wall thickness. Diastolic filling parameters suggest grade II diastolic  dysfunction.   Normal right ventricular size and function.   The left atrium is moderately dilated.   The right atrium is mildly dilated.   Mild to moderate mitral and tricuspid regurgitation.   Estimated pulmonary artery systolic pressure is mildly elevated at 40 mmHg  assuming a right atrial pressure of 3 mmHg.     1/2006: Angiogram/PCI  70% mid LAD and 100% Dg2 (TATO placed)  POBA RCA (residual 50%)     Echo 1/2006:  LVEF 45%; distal anterior, anterolateral and apical HK; mod TR    Assessment:      1. Chronic systolic heart failure (HCC)  -last echo demonstrated improved LVEF to 45-50%  -NYHA class II  -continue Entresto, carvedilol and lasix  -ICD therapy not indicated with improved LVEF    2. Ischemic cardiomyopathy/Coronary artery disease involving native coronary artery of native heart without angina pectoris   -prior stents to LAD, Dg, and RCA  -last cath in 10/2019 demonstrated patent stents  -LVEF 45-50% on echo in February 2020 (improved from 35% on prior echo)  -continue ASA, statin , carvedilol and Entresto  -no aldactone given her improved LVEF   -not an ICD candidate with improved LVEF  -no recurrent angina    3. Hyperlipidemia LDL goal <70  -atorvastatin increased in April 2021  -check hepatic panel in September 2021    4.  Essential hypertension  -goal BP < 130/80  -BP well controlled  -continue medical management      Plan:  Continue ASA, lasix, coreg, Entresto and lipitor  Discussed and reviewed low-fat/low sodium diet, monitoring of daily weights, fluid restriction, worsening signs and symptoms of heart failure and when to call, and the importance of regular exercise and activity. Check BMP and hepatic panel in September 2021  Follow up with Dr. Sai Lara in 6 months or sooner if needed    Return in about 6 months (around 12/16/2021) for with Dr. Boone Bunn. Thanks for allowing me to participate in the care of this patient.       RAMY Mcgarry  [facility], [address] [place] [address] [address] [place] [address]  Office: [phone]  Fax: [phone]      Electronically signed by RUDDY Stark CNP on 6/16/2021 at 11:14 AM

## 2021-06-16 ENCOUNTER — OFFICE VISIT (OUTPATIENT)
Dept: CARDIOLOGY CLINIC | Age: 71
End: 2021-06-16
Payer: MEDICARE

## 2021-06-16 VITALS
SYSTOLIC BLOOD PRESSURE: 132 MMHG | HEART RATE: 79 BPM | WEIGHT: 152 LBS | OXYGEN SATURATION: 100 % | BODY MASS INDEX: 29.84 KG/M2 | DIASTOLIC BLOOD PRESSURE: 82 MMHG | HEIGHT: 60 IN

## 2021-06-16 DIAGNOSIS — I25.5 ISCHEMIC CARDIOMYOPATHY: ICD-10-CM

## 2021-06-16 DIAGNOSIS — I10 ESSENTIAL HYPERTENSION: ICD-10-CM

## 2021-06-16 DIAGNOSIS — I25.10 CORONARY ARTERY DISEASE INVOLVING NATIVE CORONARY ARTERY OF NATIVE HEART WITHOUT ANGINA PECTORIS: Primary | ICD-10-CM

## 2021-06-16 DIAGNOSIS — I50.22 CHRONIC SYSTOLIC HEART FAILURE (HCC): ICD-10-CM

## 2021-06-16 DIAGNOSIS — E78.5 HYPERLIPIDEMIA LDL GOAL <70: ICD-10-CM

## 2021-06-16 PROCEDURE — 99214 OFFICE O/P EST MOD 30 MIN: CPT | Performed by: NURSE PRACTITIONER

## 2021-06-16 PROCEDURE — 93000 ELECTROCARDIOGRAM COMPLETE: CPT | Performed by: NURSE PRACTITIONER

## 2021-06-16 RX ORDER — ATORVASTATIN CALCIUM 80 MG/1
80 TABLET, FILM COATED ORAL DAILY
Qty: 90 TABLET | Refills: 3 | Status: SHIPPED
Start: 2021-06-16 | End: 2021-12-06 | Stop reason: ALTCHOICE

## 2021-06-16 NOTE — PATIENT INSTRUCTIONS
Continue same medications. Make sure Atorvastatin is 80 mg tablet.     Check labs in September 2021: BMP and hepatic panel (Nonfasting labs)

## 2021-07-21 ENCOUNTER — TELEPHONE (OUTPATIENT)
Dept: CARDIOLOGY CLINIC | Age: 71
End: 2021-07-21

## 2021-07-21 DIAGNOSIS — I25.5 ISCHEMIC CARDIOMYOPATHY: ICD-10-CM

## 2021-07-21 DIAGNOSIS — I50.22 CHRONIC SYSTOLIC HEART FAILURE (HCC): ICD-10-CM

## 2021-07-21 RX ORDER — SACUBITRIL AND VALSARTAN 49; 51 MG/1; MG/1
TABLET, FILM COATED ORAL
Qty: 56 TABLET | Refills: 0 | COMMUNITY
Start: 2021-07-21 | End: 2021-11-15 | Stop reason: SDUPTHER

## 2021-07-21 NOTE — TELEPHONE ENCOUNTER
Call patient to make aware that she can  samples at [facility] office. She said that she will either pick them up today or tomorrow.

## 2021-07-21 NOTE — TELEPHONE ENCOUNTER
Medication Samples    Medication:ENTRESTO    Dosage of the medication:49-51    How are you taking this medication (QD, BID, TID, QID, PRN):TAKE ONE TABLET BY MOUTH TWICE A DAY     in the office or Mail to your home?

## 2021-11-15 DIAGNOSIS — I25.5 ISCHEMIC CARDIOMYOPATHY: ICD-10-CM

## 2021-11-15 DIAGNOSIS — I50.22 CHRONIC SYSTOLIC HEART FAILURE (HCC): ICD-10-CM

## 2021-11-15 RX ORDER — SACUBITRIL AND VALSARTAN 49; 51 MG/1; MG/1
TABLET, FILM COATED ORAL
Qty: 180 TABLET | Refills: 3 | Status: SHIPPED | OUTPATIENT
Start: 2021-11-15

## 2021-11-15 RX ORDER — CARVEDILOL 6.25 MG/1
6.25 TABLET ORAL 2 TIMES DAILY
Qty: 180 TABLET | Refills: 3 | Status: SHIPPED | OUTPATIENT
Start: 2021-11-15 | End: 2021-12-06 | Stop reason: DRUGHIGH

## 2021-11-15 RX ORDER — FUROSEMIDE 20 MG/1
20 TABLET ORAL EVERY OTHER DAY
Qty: 45 TABLET | Refills: 3 | Status: SHIPPED | OUTPATIENT
Start: 2021-11-15

## 2021-11-15 NOTE — TELEPHONE ENCOUNTER
Medication Refill    Medication needing refilled:  sacubitril-valsartan (ENTRESTO)  furosemide (LASIX)  carvedilol (COREG)    Dosage of the medication:  49-51 MG per tablet   20 MG tablet   6.25 MG tablet     How are you taking this medication (QD, BID, TID, QID, PRN):  TAKE ONE TABLET BY MOUTH TWICE A DAY  Take 1 tablet by mouth every other day  Take 1 tablet by mouth 2 times daily    30 or 90 day supply called in:    When will you run out of your medication:    Which Pharmacy are we sending the medication to?:    [address] [place] [address] [phone] 800 [facility], [address] [place] [address]   Phone:  [phone]  Fax:  [phone]

## 2021-11-27 ENCOUNTER — APPOINTMENT (OUTPATIENT)
Dept: CT IMAGING | Age: 71
End: 2021-11-27
Payer: MEDICARE

## 2021-11-27 ENCOUNTER — HOSPITAL ENCOUNTER (EMERGENCY)
Age: 71
Discharge: HOME | End: 2021-11-28
Attending: STUDENT IN AN ORGANIZED HEALTH CARE EDUCATION/TRAINING PROGRAM
Payer: MEDICARE

## 2021-11-27 DIAGNOSIS — S01.01XA OCCIPITAL SCALP LACERATION, INITIAL ENCOUNTER: ICD-10-CM

## 2021-11-27 DIAGNOSIS — S00.03XA CONTUSION OF OCCIPITAL REGION OF SCALP, INITIAL ENCOUNTER: ICD-10-CM

## 2021-11-27 DIAGNOSIS — F10.929 ACUTE ALCOHOLIC INTOXICATION WITH COMPLICATION (HCC): ICD-10-CM
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
## 2021-11-27 PROCEDURE — 12004 RPR S/N/AX/GEN/TRK7.6-12.5CM: CPT

## 2021-11-27 PROCEDURE — 70450 CT HEAD/BRAIN W/O DYE: CPT

## 2021-11-27 PROCEDURE — 99284 EMERGENCY DEPT VISIT MOD MDM: CPT

## 2021-11-27 PROCEDURE — 72125 CT NECK SPINE W/O DYE: CPT

## 2021-11-27 RX ORDER — LIDOCAINE HYDROCHLORIDE AND EPINEPHRINE 10; 10 MG/ML; UG/ML
20 INJECTION, SOLUTION INFILTRATION; PERINEURAL ONCE
Status: COMPLETED | OUTPATIENT
Start: 2021-11-27 | End: 2021-11-28

## 2021-11-28 VITALS
OXYGEN SATURATION: 98 % | SYSTOLIC BLOOD PRESSURE: 115 MMHG | TEMPERATURE: 97.6 F | BODY MASS INDEX: 31.6 KG/M2 | HEART RATE: 85 BPM | WEIGHT: 161.82 LBS | RESPIRATION RATE: 16 BRPM | DIASTOLIC BLOOD PRESSURE: 58 MMHG

## 2021-11-28 PROCEDURE — 6360000002 HC RX W HCPCS: Performed by: STUDENT IN AN ORGANIZED HEALTH CARE EDUCATION/TRAINING PROGRAM

## 2021-11-28 PROCEDURE — 2500000003 HC RX 250 WO HCPCS: Performed by: STUDENT IN AN ORGANIZED HEALTH CARE EDUCATION/TRAINING PROGRAM

## 2021-11-28 PROCEDURE — 90715 TDAP VACCINE 7 YRS/> IM: CPT | Performed by: STUDENT IN AN ORGANIZED HEALTH CARE EDUCATION/TRAINING PROGRAM

## 2021-11-28 PROCEDURE — 90471 IMMUNIZATION ADMIN: CPT | Performed by: STUDENT IN AN ORGANIZED HEALTH CARE EDUCATION/TRAINING PROGRAM

## 2021-11-28 RX ADMIN — TETANUS TOXOID, REDUCED DIPHTHERIA TOXOID AND ACELLULAR PERTUSSIS VACCINE, ADSORBED 0.5 ML: 5; 2.5; 8; 8; 2.5 SUSPENSION INTRAMUSCULAR at 00:32

## 2021-11-28 RX ADMIN — LIDOCAINE HYDROCHLORIDE,EPINEPHRINE BITARTRATE 20 ML: 10; .01 INJECTION, SOLUTION INFILTRATION; PERINEURAL at 01:10

## 2021-11-28 ASSESSMENT — PAIN SCALES - GENERAL: PAINLEVEL_OUTOF10: 0

## 2021-11-28 NOTE — ED NOTES
Bed: A-17  Expected date: 11/27/21  Expected time: 9:18 PM  Means of arrival: [facility]  Comments:  Fall injury     Alycia Olivera RN  11/27/21 [id number]

## 2021-11-28 NOTE — ED NOTES
With the assistance of Dawn, cleaned patient head and wrap patient head with gauze and coband.    CLEMENTINA Linares made aware     Liam Rivera  11/27/21 [id number]

## 2021-11-28 NOTE — ED NOTES
Patient arrives to the ED from home after a fall occurred. Patient states she had consumed \"a couple\" beers and when she went to the restroom she tripped and got a laceration on her scalp. Patient is alert and oriented. Denies any pain at this time. Laceration is on the left posterior side of patients scalp. Bleeding is controlled at this time.       Levy De La Vega RN  11/27/21 [id number]

## 2021-11-28 NOTE — ED PROVIDER NOTES
[address] [facility]      Pt Name: Remy Caraballo  MRN: [id number]  Carlylegfmario 1950  Date of evaluation: 11/27/2021  Provider: Edin Schultz MD    CHIEF COMPLAINT       Chief Complaint   Patient presents with    Fall     Had \"a couple\" beers, tripped in bathroom. Head lac on right side of head. Denies LOC. No blood thinners.  Head Laceration         HISTORY OF PRESENT ILLNESS   (Location/Symptom, Timing/Onset,Context/Setting, Quality, Duration, Modifying Factors, Severity)  Note limiting factors. Remy Caraballo is a 66 y.o. female who presents to the emergency department status post fall, complaining of posterior scalp pain after fall backwards while intoxicated. Patient had been celebrating and drinking alcohol when she tripped in the bathroom, sustaining occipital scalp laceration. Denies LOC, denies anticoagulation, denies focal weakness, nausea, vomiting. States headache is present however. Mild associated neck pain. No focal weakness, no sensory loss, no chest pain. Symptoms not otherwise alleviated or exacerbated by other factors. NursingNotes were reviewed. REVIEW OF SYSTEMS    (2-9 systems for level 4, 10 or more for level 5)       Constitutional: No fever or chills. Eye: No visual disturbances. No eye pain. Ear/Nose/Mouth/Throat: No nasal congestion. No sore throat. Respiratory: No cough, No shortness of breath, No sputum production. Cardiovascular: No chest pain. No palpitations. Gastrointestinal: No abdominal pain. No nausea or vomiting  Genitourinary: No dysuria. No hematuria. Hematology/Lymphatics: No bleeding or bruising tendency. Immunologic: No malaise. No swollen glands. Musculoskeletal: No back pain. No joint pain. Integumentary: No rash. No abrasions. + Laceration  Neurologic: +headache. No focal numbness or weakness.       PAST MEDICAL HISTORY     Past Medical History:   Diagnosis Date    CAD (coronary artery disease)     HTN (hypertension)     Hyperlipidemia     Ischemic cardiomyopathy          SURGICALHISTORY       Past Surgical History:   Procedure Laterality Date    CORONARY ANGIOPLASTY WITH STENT PLACEMENT           CURRENT MEDICATIONS       Discharge Medication List as of 11/28/2021  1:10 AM      CONTINUE these medications which have NOT CHANGED    Details   sacubitril-valsartan (ENTRESTO) 49-51 MG per tablet TAKE ONE TABLET BY MOUTH TWICE A DAY, Disp-180 tablet, R-3Normal      furosemide (LASIX) 20 MG tablet Take 1 tablet by mouth every other day, Disp-45 tablet, R-3Normal      carvedilol (COREG) 6.25 MG tablet Take 1 tablet by mouth 2 times daily, Disp-180 tablet, R-3Normal      atorvastatin (LIPITOR) 80 MG tablet Take 1 tablet by mouth daily, Disp-90 tablet, R-3Normal      aspirin (ASPIRIN LOW DOSE) 81 MG chewable tablet Take 1 tablet by mouth daily, Disp-90 tablet, R-2Normal      nitroGLYCERIN (NITROSTAT) 0.4 MG SL tablet Place 1 tablet under the tongue every 5 minutes as needed for Chest pain up to max of 3 total doses. If no relief after 1 dose, call 911., Disp-25 tablet, R-3Normal             ALLERGIES     Patient has no known allergies. FAMILY HISTORY       Family History   Problem Relation Age of Onset    Diabetes Father     Heart Disease Father     Heart Attack Sister     Heart Attack Brother     Heart Surgery Brother           SOCIAL HISTORY       Social History     Socioeconomic History    Marital status:       Spouse name: None    Number of children: None    Years of education: None    Highest education level: None   Occupational History    None   Tobacco Use    Smoking status: Never Smoker    Smokeless tobacco: Never Used   Vaping Use    Vaping Use: Never used   Substance and Sexual Activity    Alcohol use: Yes     Comment: sOCIALLY    Drug use: Never    Sexual activity: Never   Other Topics Concern    None   Social History Narrative    None     Social Determinants of Health     Financial Resource Strain:     Difficulty of Paying Living Expenses: Not on file   Food Insecurity:     Worried About Running Out of Food in the Last Year: Not on file    Ash of Food in the Last Year: Not on file   Transportation Needs:     Lack of Transportation (Medical): Not on file    Lack of Transportation (Non-Medical): Not on file   Physical Activity:     Days of Exercise per Week: Not on file    Minutes of Exercise per Session: Not on file   Stress:     Feeling of Stress : Not on file   Social Connections:     Frequency of Communication with Friends and Family: Not on file    Frequency of Social Gatherings with Friends and Family: Not on file    Attends Zoroastrianism Services: Not on file    Active Member of [address] [place] [address] [employer] or Organizations: Not on file    Attends Club or Organization Meetings: Not on file    Marital Status: Not on file   Intimate Partner Violence:     Fear of Current or Ex-Partner: Not on file    Emotionally Abused: Not on file    Physically Abused: Not on file    Sexually Abused: Not on file   Housing Stability:     Unable to Pay for Housing in the Last Year: Not on file    Number of [place] in the Last Year: Not on file    Unstable Housing in the Last Year: Not on file       SCREENINGS             PHYSICAL EXAM    (up to 7 for level 4, 8 or more for level 5)     ED Triage Vitals [11/27/21 2131]   BP Temp Temp Source Pulse Resp SpO2 Height Weight   (!) 156/63 97.6 °F (36.4 °C) Oral 78 16 97 % -- 161 lb 13.1 oz (73.4 kg)       General: Alert and oriented appropriately for age, No acute distress. Eye: Normal conjunctiva. Pupils equal and reactive. HENT: Oral mucosa is moist.  8 cm occipital curvilinear scalp laceration with underlying boggy contusion. No skull depression or deformity. No facial ecchymosis, no orbital rim tenderness, no trismus. No step-offs.   CERVICAL SPINE: There is cervical midline tenderness to palpation, no step-offs, or acute deformities. Respiratory: Respirations even and non-labored. Chest wall nontender to palpation. Cardiovascular: Normal rate, Regular rhythm. Intact peripheral pulses. No edema. Gastrointestinal: Soft, Non-tender, Non-distended. Musculoskeletal: No swelling. Integumentary: Warm, Dry. Neurologic: Alert and appropriate for age. No focal deficits. Psychiatric: Cooperative. DIAGNOSTIC RESULTS         RADIOLOGY:   Non-plain filmimages such as CT, Ultrasound and MRI are read by the radiologist. Plain radiographic images are visualized and preliminarily interpreted by the emergency physician with the below findings:      Interpretation per the Radiologist below, if available at the time ofthis note:    CT CERVICAL SPINE WO CONTRAST   Final Result   No acute abnormality of the cervical spine. CT HEAD WO CONTRAST   Final Result   Small posterior scalp hematoma. No evidence of acute intracranial hemorrhage   or fracture. EMERGENCY DEPARTMENT COURSE and DIFFERENTIAL DIAGNOSIS/MDM:   Vitals:    Vitals:    21 2146 21 2202 21 2329 21 0110   BP: 123/63 124/66 118/66 (!) 115/58   Pulse:   74 85   Resp:       Temp:       TempSrc:       SpO2: 97% 95% 98%    Weight:             Medical decision makin-year-old female who presents status post mechanical fall, fell backwards while drinking at a bar, states had multiple drinks and fell backwards, striking her head on the ground, associated with neck pain. Denies focal weakness, nausea, vomiting, abdominal pain, chest pain, focal sensory loss, shortness of breath. Given head trauma, intoxication, septal scalp contusion and laceration, getting CT brain, CT C-spine to ensure no acute traumatic abnormalities or injuries. CTs negative acute. Cervical spine clinically cleared. Laceration repaired with staples as in my procedure note after it was irrigated.   Wound care instructions, return precautions given, anticipatory guidance, patient and granddaughter at bedside voiced understanding. Patient has increased sobriety, no longer clinically intoxicated after period of observation, reliable family member present for transportation. Stable for and amenable to discharge home. PROCEDURES:  Lac Repair    Date/Time: 11/28/2021 10:17 AM  Performed by: Yoselyn Ramachandran MD  Authorized by: Yoselyn Ramachandran MD     Consent:     Consent obtained:  Verbal    Consent given by:  Patient    Risks discussed:  Infection, pain, poor cosmetic result, poor wound healing and need for additional repair    Alternatives discussed:  No treatment  Anesthesia (see MAR for exact dosages): Anesthesia method:  Local infiltration    Local anesthetic:  Lidocaine 1% WITH epi  Laceration details:     Location:  Scalp    Scalp location:  Occipital    Length (cm):  8    Depth (mm):  3  Repair type:     Repair type:  Simple  Pre-procedure details:     Preparation:  Patient was prepped and draped in usual sterile fashion and imaging obtained to evaluate for foreign bodies  Exploration:     Hemostasis achieved with:  Direct pressure and epinephrine    Wound exploration: wound explored through full range of motion and entire depth of wound probed and visualized      Wound extent: no fascia violation noted and no foreign bodies/material noted      Contaminated: yes    Treatment:     Area cleansed with:  Saline    Amount of cleaning:  Extensive    Irrigation solution:  Sterile saline    Irrigation volume:  1000 mL    Irrigation method:  Syringe  Skin repair:     Repair method:  Staples    Number of staples:  8  Approximation:     Approximation:  Close  Post-procedure details:     Dressing:  Non-adherent dressing    Patient tolerance of procedure: Tolerated well, no immediate complications               FINAL IMPRESSION      1. Fall, initial encounter    2. Occipital scalp laceration, initial encounter    3.  Contusion of occipital region of scalp, initial encounter    4. Acute alcoholic intoxication with complication [facility])          DISPOSITION/PLAN   DISPOSITION Decision To Discharge 11/28/2021 01:06:12 AM      PATIENT REFERRED TO:  Baljeet Levy MD  [address] [address] [address] [phone]    In 8 days  For suture removal, For wound re-check    [facility] Emergency Department  [address] [facility]  [phone]  In 8 days  For suture removal, For wound re-check if you can't get into your PCP or if signs or symptoms of infection develop as we discussed.       DISCHARGE MEDICATIONS:  Discharge Medication List as of 11/28/2021  1:10 AM             (Please note that portions of this note were completed with a voice recognition program.Efforts were made to edit the dictations but occasionally words are mis-transcribed.)    Cooper Xiong MD (electronically signed)  Attending Emergency Physician          Cooper Xiong MD  11/28/21 1024

## 2021-12-06 ENCOUNTER — OFFICE VISIT (OUTPATIENT)
Dept: CARDIOLOGY CLINIC | Age: 71
End: 2021-12-06
Payer: COMMERCIAL

## 2021-12-06 VITALS
SYSTOLIC BLOOD PRESSURE: 134 MMHG | DIASTOLIC BLOOD PRESSURE: 86 MMHG | WEIGHT: 151 LBS | HEART RATE: 70 BPM | OXYGEN SATURATION: 96 % | HEIGHT: 60 IN | BODY MASS INDEX: 29.64 KG/M2

## 2021-12-06 DIAGNOSIS — I10 ESSENTIAL HYPERTENSION: ICD-10-CM

## 2021-12-06 DIAGNOSIS — E78.5 HYPERLIPIDEMIA LDL GOAL <70: ICD-10-CM

## 2021-12-06 DIAGNOSIS — I25.10 CAD IN NATIVE ARTERY: ICD-10-CM

## 2021-12-06 DIAGNOSIS — I50.22 CHRONIC SYSTOLIC HEART FAILURE (HCC): Primary | ICD-10-CM

## 2021-12-06 DIAGNOSIS — I25.5 ISCHEMIC CARDIOMYOPATHY: ICD-10-CM

## 2021-12-06 PROCEDURE — 99214 OFFICE O/P EST MOD 30 MIN: CPT | Performed by: INTERNAL MEDICINE

## 2021-12-06 RX ORDER — CARVEDILOL 6.25 MG/1
25 TABLET ORAL 2 TIMES DAILY
Qty: 180 TABLET | Refills: 3 | Status: SHIPPED | OUTPATIENT
Start: 2021-12-06 | End: 2021-12-08 | Stop reason: SDUPTHER

## 2021-12-06 RX ORDER — ROSUVASTATIN CALCIUM 40 MG/1
40 TABLET, COATED ORAL EVERY EVENING
Qty: 90 TABLET | Refills: 5 | Status: SHIPPED | OUTPATIENT
Start: 2021-12-06 | End: 2022-09-21 | Stop reason: SDUPTHER

## 2021-12-06 NOTE — PATIENT INSTRUCTIONS
Patient Education        Heart-Healthy Diet: Care Instructions  Your Care Instructions     A heart-healthy diet has lots of vegetables, fruits, nuts, beans, and whole grains, and is low in salt. It limits foods that are high in saturated fat, such as meats, cheeses, and fried foods. It may be hard to change your diet, but even small changes can lower your risk of heart attack and heart disease. Follow-up care is a key part of your treatment and safety. Be sure to make and go to all appointments, and call your doctor if you are having problems. It's also a good idea to know your test results and keep a list of the medicines you take. How can you care for yourself at home? Watch your portions  · Use food labels to learn what the recommended servings are for the foods you eat. · Eat only the number of calories you need to stay at a healthy weight. If you need to lose weight, eat fewer calories than your body burns (through exercise and other physical activity). Eat more fruits and vegetables  · Eat a variety of fruit and vegetables every day. Dark green, deep orange, red, or yellow fruits and vegetables are especially good for you. Examples include spinach, carrots, peaches, and berries. · Keep carrots, celery, and other veggies handy for snacks. Buy fruit that is in season and store it where you can see it so that you will be tempted to eat it. · Cook dishes that have a lot of veggies in them, such as stir-fries and soups. Limit saturated fat  · Read food labels, and try to avoid saturated fats. They increase your risk of heart disease. · Use olive or canola oil when you cook. · Bake, broil, grill, or steam foods instead of frying them. · Choose lean meats instead of high-fat meats such as hot dogs and sausages. Cut off all visible fat when you prepare meat. · Eat fish, skinless poultry, and meat alternatives such as soy products instead of high-fat meats.  Soy products, such as tofu, may be especially good for your heart. · Choose low-fat or fat-free milk and dairy products. Eat foods high in fiber  · Eat a variety of grain products every day. Include whole-grain foods that have lots of fiber and nutrients. Examples of whole-grain foods include oats, whole wheat bread, and brown rice. · Buy whole-grain breads and cereals, instead of white bread or pastries. Limit salt and sodium  · Limit how much salt and sodium you eat to help lower your blood pressure. · Taste food before you salt it. Add only a little salt when you think you need it. With time, your taste buds will adjust to less salt. · Eat fewer snack items, fast foods, and other high-salt, processed foods. Check food labels for the amount of sodium in packaged foods. · Choose low-sodium versions of canned goods (such as soups, vegetables, and beans). Limit sugar  · Limit drinks and foods with added sugar. These include candy, desserts, and soda pop. Limit alcohol  · Limit alcohol to no more than 2 drinks a day for men and 1 drink a day for women. Too much alcohol can cause health problems. When should you call for help? Watch closely for changes in your health, and be sure to contact your doctor if:    · You would like help planning heart-healthy meals. Where can you learn more? Go to https://[employer]. org and sign in to your [employer] account. Enter V137 in the [facility] box to learn more about \"Heart-Healthy Diet: Care Instructions. \"     If you do not have an account, please click on the \"Sign Up Now\" link. Current as of: December 17, 2020               Content Version: 13.0  © 0773-5635 Healthwise, [employer]. Care instructions adapted under license by [facility]). If you have questions about a medical condition or this instruction, always ask your healthcare professional. [address]  any warranty or liability for your use of this information.        1. Finish taking Atorvastatin then switch to Rosuvastatin 40 mg nightly (Rosuvastatin is a smaller pill and more powerful statin). 2. Increase Coreg to 25 mg twice daily. If unable to tolerate, reduce to 12.5 mg twice daily. This medication is to help improve your heart function.

## 2021-12-07 DIAGNOSIS — E78.5 HYPERLIPIDEMIA LDL GOAL <70: ICD-10-CM

## 2021-12-07 DIAGNOSIS — I50.22 CHRONIC SYSTOLIC HEART FAILURE (HCC): ICD-10-CM

## 2021-12-07 DIAGNOSIS — I25.5 ISCHEMIC CARDIOMYOPATHY: ICD-10-CM

## 2021-12-07 LAB
ALBUMIN SERPL-MCNC: 4.5 G/DL (ref 3.4–5)
ALP BLD-CCNC: 91 U/L (ref 40–129)
ALT SERPL-CCNC: 27 U/L (ref 10–40)
ANION GAP SERPL CALCULATED.3IONS-SCNC: 14 MMOL/L (ref 3–16)
AST SERPL-CCNC: 23 U/L (ref 15–37)
BILIRUB SERPL-MCNC: 0.5 MG/DL (ref 0–1)
BILIRUBIN DIRECT: <0.2 MG/DL (ref 0–0.3)
BILIRUBIN, INDIRECT: NORMAL MG/DL (ref 0–1)
BUN BLDV-MCNC: 16 MG/DL (ref 7–20)
CALCIUM SERPL-MCNC: 9.3 MG/DL (ref 8.3–10.6)
CHLORIDE BLD-SCNC: 107 MMOL/L (ref 99–110)
CHOLESTEROL, FASTING: 134 MG/DL (ref 0–199)
CO2: 21 MMOL/L (ref 21–32)
CREAT SERPL-MCNC: 0.6 MG/DL (ref 0.6–1.2)
GFR AFRICAN AMERICAN: >60
GFR NON-AFRICAN AMERICAN: >60
GLUCOSE BLD-MCNC: 102 MG/DL (ref 70–99)
HDLC SERPL-MCNC: 42 MG/DL (ref 40–60)
LDL CHOLESTEROL CALCULATED: 63 MG/DL
POTASSIUM SERPL-SCNC: 4.1 MMOL/L (ref 3.5–5.1)
SODIUM BLD-SCNC: 142 MMOL/L (ref 136–145)
TOTAL PROTEIN: 6.7 G/DL (ref 6.4–8.2)
TRIGLYCERIDE, FASTING: 144 MG/DL (ref 0–150)
VLDLC SERPL CALC-MCNC: 29 MG/DL

## 2021-12-08 DIAGNOSIS — I25.5 ISCHEMIC CARDIOMYOPATHY: ICD-10-CM

## 2021-12-08 RX ORDER — CARVEDILOL 25 MG/1
25 TABLET ORAL 2 TIMES DAILY
Qty: 180 TABLET | Refills: 5 | Status: SHIPPED | OUTPATIENT
Start: 2021-12-08

## 2022-01-21 ENCOUNTER — TELEPHONE (OUTPATIENT)
Dept: CARDIOLOGY CLINIC | Age: 72
End: 2022-01-21

## 2022-01-21 NOTE — TELEPHONE ENCOUNTER
Called and spoke with patient letting her know that her requested samples are ready for  here in the office, patient expressed understanding. Samples prepared and placed in closet.

## 2022-01-21 NOTE — TELEPHONE ENCOUNTER
Medication Samples    Medication:  sacubitril-valsartan (ENTRESTO)    Dosage of the medication:  49-51 MG per tablet     How are you taking this medication (QD, BID, TID, QID, PRN):  TAKE ONE TABLET BY MOUTH TWICE A DAY     in the office or Mail to your home?  in office.  You can reach Kleber Nava at #[phone]

## 2022-03-06 DIAGNOSIS — I25.5 ISCHEMIC CARDIOMYOPATHY: ICD-10-CM

## 2022-03-07 RX ORDER — ASPIRIN 81 MG
TABLET,CHEWABLE ORAL
Qty: 90 TABLET | Refills: 2 | Status: SHIPPED | OUTPATIENT
Start: 2022-03-07

## 2022-03-10 ENCOUNTER — TELEPHONE (OUTPATIENT)
Dept: CARDIOLOGY CLINIC | Age: 72
End: 2022-03-10

## 2022-03-10 NOTE — TELEPHONE ENCOUNTER
Medication Samples    Medication:  sacubitril-valsartan (ENTRESTO)    Dosage of the medication:  49-51 MG per tablet     How are you taking this medication (QD, BID, TID, QID, PRN):  TAKE ONE TABLET BY MOUTH TWICE A DAY     in the office or Mail to your home? Wes Wadsworth stated that her daughter Nini Zamorano will be picking them up tomorrow morning.      You can reach Wes Wadsworth at #[phone]

## 2022-03-11 NOTE — TELEPHONE ENCOUNTER
Alicja Lloyd called in this afternoon stating that her daughter wasn't able to pick the samples up today, but will be by Monday 3/14/22 to pick them up.

## 2022-06-06 ENCOUNTER — TELEPHONE (OUTPATIENT)
Dept: CARDIOLOGY CLINIC | Age: 72
End: 2022-06-06

## 2022-06-06 NOTE — TELEPHONE ENCOUNTER
Medication Samples     Medication:  sacubitril-valsartan (ENTRESTO)     Dosage of the medication:  49-51 MG per tablet      How are you taking this medication (QD, BID, TID, QID, PRN):  TAKE ONE TABLET BY MOUTH TWICE A DAY      in the office or Mail to your home?     You can reach Marlen Littlejohn at #[phone]

## 2022-09-15 ENCOUNTER — TELEPHONE (OUTPATIENT)
Dept: CARDIOLOGY CLINIC | Age: 72
End: 2022-09-15

## 2022-09-15 NOTE — TELEPHONE ENCOUNTER
Medication Samples    Medication:  sacubitril-valsartan (ENTRESTO    Dosage of the medication:  49-51 MG per tablet     How are you taking this medication (QD, BID, TID, QID, PRN):  TAKE ONE TABLET BY MOUTH TWICE A DAY     in the office or Mail to your home?     She can be reached at [phone].

## 2022-09-21 RX ORDER — ROSUVASTATIN CALCIUM 40 MG/1
40 TABLET, COATED ORAL EVERY EVENING
Qty: 90 TABLET | Refills: 3 | Status: SHIPPED | OUTPATIENT
Start: 2022-09-21

## 2022-09-21 NOTE — TELEPHONE ENCOUNTER
Medication Refill    Medication needing refilled:  rosuvastatin (CRESTOR)     Dosage of the medication:  40 MG tablet     How are you taking this medication (QD, BID, TID, QID, PRN):  Take 1 tablet by mouth every evening    30 or 90 day supply called in:  90 days    When will you run out of your medication:  few days    Which Pharmacy are we sending the medication to?:  [facility] [id number] Socorro Medrano, [address] [place] [address] [phone] [address] [facility] [address]   Phone:  [phone]  Fax:  [phone]

## 2022-11-15 DIAGNOSIS — I25.5 ISCHEMIC CARDIOMYOPATHY: ICD-10-CM

## 2022-11-15 DIAGNOSIS — I50.22 CHRONIC SYSTOLIC HEART FAILURE (HCC): ICD-10-CM

## 2022-11-15 RX ORDER — SACUBITRIL AND VALSARTAN 49; 51 MG/1; MG/1
TABLET, FILM COATED ORAL
Qty: 180 TABLET | Refills: 1 | Status: SHIPPED | OUTPATIENT
Start: 2022-11-15

## 2022-11-26 DIAGNOSIS — I50.22 CHRONIC SYSTOLIC HEART FAILURE (HCC): ICD-10-CM

## 2022-11-28 NOTE — TELEPHONE ENCOUNTER
Last O/V:  12/06/2021  Next O/V:  12/15/2022  Last Labs:  [facility] 12/07/2021

## 2022-11-29 RX ORDER — FUROSEMIDE 20 MG/1
TABLET ORAL
Qty: 45 TABLET | Refills: 3 | Status: SHIPPED | OUTPATIENT
Start: 2022-11-29

## 2022-12-12 DIAGNOSIS — I25.5 ISCHEMIC CARDIOMYOPATHY: ICD-10-CM

## 2022-12-12 RX ORDER — ASPIRIN 81 MG/1
TABLET, CHEWABLE ORAL
Qty: 90 TABLET | Refills: 0 | Status: SHIPPED | OUTPATIENT
Start: 2022-12-12 | End: 2022-12-15 | Stop reason: SDUPTHER

## 2022-12-12 NOTE — TELEPHONE ENCOUNTER
Medication Refill    Medication needing refilled:  LOW DOSE ASPIRIN     Dosage of the medication:  81 MG chewable tablet     How are you taking this medication (QD, BID, TID, QID, PRN):  CHEW ONE TABLET BY MOUTH DAILY    30 or 90 day supply called in:  90 day supply     When will you run out of your medication:  Couple days left     Which Pharmacy are we sending the medication to?:  [facility] [id number] Júnior Gonzales, [address] [place] [address] [phone]

## 2022-12-15 ENCOUNTER — OFFICE VISIT (OUTPATIENT)
Dept: CARDIOLOGY CLINIC | Age: 72
End: 2022-12-15
Payer: MEDICARE

## 2022-12-15 VITALS
HEART RATE: 71 BPM | DIASTOLIC BLOOD PRESSURE: 74 MMHG | SYSTOLIC BLOOD PRESSURE: 130 MMHG | WEIGHT: 150 LBS | OXYGEN SATURATION: 98 % | BODY MASS INDEX: 29.45 KG/M2 | HEIGHT: 60 IN

## 2022-12-15 DIAGNOSIS — I10 ESSENTIAL HYPERTENSION: ICD-10-CM

## 2022-12-15 DIAGNOSIS — I25.10 CAD IN NATIVE ARTERY: ICD-10-CM

## 2022-12-15 DIAGNOSIS — I25.5 ISCHEMIC CARDIOMYOPATHY: ICD-10-CM

## 2022-12-15 DIAGNOSIS — E78.5 HYPERLIPIDEMIA LDL GOAL <70: ICD-10-CM

## 2022-12-15 DIAGNOSIS — I50.22 CHRONIC SYSTOLIC HEART FAILURE (HCC): Primary | ICD-10-CM

## 2022-12-15 PROCEDURE — 3078F DIAST BP <80 MM HG: CPT | Performed by: INTERNAL MEDICINE

## 2022-12-15 PROCEDURE — 1123F ACP DISCUSS/DSCN MKR DOCD: CPT | Performed by: INTERNAL MEDICINE

## 2022-12-15 PROCEDURE — 3074F SYST BP LT 130 MM HG: CPT | Performed by: INTERNAL MEDICINE

## 2022-12-15 PROCEDURE — 99214 OFFICE O/P EST MOD 30 MIN: CPT | Performed by: INTERNAL MEDICINE

## 2022-12-15 PROCEDURE — 93000 ELECTROCARDIOGRAM COMPLETE: CPT | Performed by: INTERNAL MEDICINE

## 2022-12-15 RX ORDER — CARVEDILOL 25 MG/1
25 TABLET ORAL 2 TIMES DAILY
Qty: 180 TABLET | Refills: 4 | Status: SHIPPED | OUTPATIENT
Start: 2022-12-15

## 2022-12-15 RX ORDER — ASPIRIN 81 MG/1
TABLET, CHEWABLE ORAL
Qty: 90 TABLET | Refills: 3 | Status: SHIPPED | OUTPATIENT
Start: 2022-12-15

## 2022-12-15 RX ORDER — ROSUVASTATIN CALCIUM 40 MG/1
40 TABLET, COATED ORAL EVERY EVENING
Qty: 90 TABLET | Refills: 3 | Status: SHIPPED | OUTPATIENT
Start: 2022-12-15

## 2022-12-15 NOTE — PROGRESS NOTES
[address]  Cardiology Note      Carlos Alejo  1950, 67 y.o.    CC:   Chief Complaint   Patient presents with    Follow-up     Miranda / Katie Andrea MD:    HPI:   This is a 67 y.o. female with a history of CAD (prior MI with PCI 2006), HTN, HLD, and CHF who presents today for management of heart failure and CAD. She was admitted to [facility] 10/2019 with CHF exacerbation. She was diuresed with Lasix and noted improvement. She was started on Entresto and her repeat echocardiogram showed an improved EF to 45-50%. Today, she returns for yearly follow up. She currently denies any cardiac symptoms. Patient denies exertional chest pain/pressure, dyspnea at rest, LINDQUIST, PND, orthopnea, palpitations, lightheadedness, weight changes, changes in LE edema, and syncope. Also admits to medical therapy compliance and tolerating. She is staying active daily.      Past Medical History:   Diagnosis Date    CAD (coronary artery disease)     HTN (hypertension)     Hyperlipidemia     Ischemic cardiomyopathy       Past Surgical History:   Procedure Laterality Date    CORONARY ANGIOPLASTY WITH STENT PLACEMENT        Family History   Problem Relation Age of Onset    Diabetes Father     Heart Disease Father     Heart Attack Sister     Heart Attack Brother     Heart Surgery Brother       Social History     Tobacco Use    Smoking status: Never    Smokeless tobacco: Never   Vaping Use    Vaping Use: Never used   Substance Use Topics    Alcohol use: Yes     Comment: sOCIALLY    Drug use: Never     No Known Allergies  Review of Systems -   Constitutional: Negative for weight gain/loss; malaise, fever  Respiratory: Negative for Asthma;  cough and hemoptysis  Cardiovascular: Negative for palpitations,dizziness   Gastrointestinal: Negative for abd.pain; constipation/diarrhea;    Genitourinary: Negative for stones; hematuria; frequency hesitancy  Integumentt: Negative for rash or pruritis  Hematologic/lymphatic: Negative for blood dyscrasia; leukemia/lymphoma  Musculoskeletal: Negative for Connective tissue disease  Neurological:  Negative for Seizure   Behavioral/Psych:Negative for Bipolar disorder, Schizophrenia; Dementia  Endocrine: negative for thyroid, parathyroid disease    Physical Examination:    /74   Pulse 71   Ht 5' (1.524 m)   Wt 150 lb (68 kg)   SpO2 98%   BMI 29.29 kg/m²      HEENT:  Face: Atraumatic, Conjunctiva: Pink; non icteric,  Mucous Memb:  Moist, No thyromegaly or Lymphadenopathy  Respiratory:  Resp Assessment: WNL, Resp Auscultation: WNL   Cardiovascular: Auscultation: nl S1 & S2, Palpation:  Nl PMI; No heaves or thrills, JVP:  normal  Abdomen: Soft, non-tender, Normal bowel sounds,  No organomegaly  Extremities: No Cyanosis or Clubbing  Neurological: Oriented to time, place, and person, Non-anxious  Psychiatric: Normal mood and affect  Skin: Warm and dry,  No rash seen     Outpatient Medications Marked as Taking for the 12/15/22 encounter (Office Visit) with Larry Adame MD   Medication Sig Dispense Refill    aspirin (ASPIRIN LOW DOSE) 81 MG chewable tablet CHEW ONE TABLET BY MOUTH DAILY 90 tablet 3    rosuvastatin (CRESTOR) 40 MG tablet Take 1 tablet by mouth every evening 90 tablet 3    furosemide (LASIX) 20 MG tablet TAKE ONE TABLET BY MOUTH EVERY OTHER DAY 45 tablet 3    ENTRESTO 49-51 MG per tablet TAKE ONE TABLET BY MOUTH TWICE A  tablet 1    carvedilol (COREG) 25 MG tablet Take 1 tablet by mouth 2 times daily 180 tablet 5    nitroGLYCERIN (NITROSTAT) 0.4 MG SL tablet Place 1 tablet under the tongue every 5 minutes as needed for Chest pain up to max of 3 total doses. If no relief after 1 dose, call 911. 74 tablet 3       Lab Results   Component Value Date/Time    HDL 42 2021 09:30 AM    LDLCALC 63 2021 09:30 AM    TRIG 149 2021 10:00 AM     EK/15/22 SR      ECHO: 20  Overall left ventricular systolic function appears mildly reduced.  Ejection fraction is visually estimated to be 45-50%. Endocardial definition is poor but there may be regional variations with more prominent lateral hypokinesis. Normal left ventricular wall thickness and cavity size. Diastolic filling parameters suggest grade I diastolic dysfunction. Normal right ventricular size and function. The left atrium is moderately dilated. The right atrium is mildly dilated. Mild tricuspid regurgitation  Trivial mitral regurgitation. Estimated pulmonary artery systolic pressure is normal at 23 mmHg assuming a right atrial pressure of 3 mmHg. ECHO 10/14/19  Overall left ventricular systolic function appears moderately reduced. Ejection fraction is visually estimated to be 35-40%. There is mild diffuse  hypokinesis and severe hypokinesis/akinesis of the basal to mid  latisha-lateral walls. Mildly dilated left ventricle. Normal left ventricular  wall thickness. Diastolic filling parameters suggest grade II diastolic dysfunction. Normal right ventricular size and function. The left atrium is moderately dilated. The right atrium is mildly dilated. Mild to moderate mitral and tricuspid regurgitation. Estimated pulmonary artery systolic pressure is mildly elevated at 40 mmHg assuming a right atrial pressure of 3 mmHg. Corornary angiogram  & Intervention: 10/17/19   1. Left main is normal.  CASIE 3 flow. No stenosis. 2.  Left anterior descending artery is patent with patent stent with  patent diagonal branches. Diagonal branch has a stent present which is  patent. 3.  Left circumflex is patent. Patent obtuse marginal branches. 4.  Right coronary artery comes from the right coronary cusp. It is a  dominant vessel. It is patent with patent stents. 5.  LV ejection fraction is 35% with LVEDP of 10 mmHg. Cath 1/2006  Successful angioplasty followed by stent deployment in the mid LAD as well as   the diagonal branches in crush technique.  100% occlusion of the diagonal branch was   reduced to 0% using 2.5 stent. 60-70% stenosis of the mid LAD reduced to 0% using 2.75   stent with a final diameter of 3.01.         ASSESSMENT AND PLAN:    Chronic systolic heart failure/Ischemic Cardiomyopathy  EF 35-40%, improved to 45-50% 2020. Denies s/s of heart failure  Physical exam normal today   On ASA, Statin, Coreg and Entresto   Use of Lasix every other day  Not a candidate for ICD with improved LVEF  Repeat BMP, CBC with next blood draw     NYHA Class II    CAD  Has coronary stents   Denies angina today and has remained stable  She is staying active daily and has not required SL Nitro   EKG today SR  Continue with medical management and risk factor modification  Continue aspirin, statin, and B-blocker    Essential hypertension  Goal BP <130/80  BP stable  Continue medical therapy. Hyperlipidemia  LDLc goal 55-70  Continue high intensity statin  LDL 82 4/2021  LDL 63 12/7/21  Rosuvastatin 40 mg nightly   Denies myalgias. Will repeat fasting CMP, lipid now. Follow up yearly. Thank you very much for allowing me to participate in the care of your patient. Please do not hesitate to contact me if you have any questions. Sincerely,    Estrella Gomez M.D  [facility], [address] [place] [address]  Ph: [phone]  Fax: [phone]      This note was scribed in the presence of Vamsi Garcia MD by Vanessa Mascorro RN.

## 2023-02-03 DIAGNOSIS — I25.5 ISCHEMIC CARDIOMYOPATHY: ICD-10-CM

## 2023-02-03 DIAGNOSIS — I50.22 CHRONIC SYSTOLIC HEART FAILURE (HCC): ICD-10-CM

## 2023-02-03 RX ORDER — SACUBITRIL AND VALSARTAN 49; 51 MG/1; MG/1
1 TABLET, FILM COATED ORAL 2 TIMES DAILY
Qty: 180 TABLET | Refills: 3 | Status: SHIPPED | OUTPATIENT
Start: 2023-02-03

## 2023-02-03 NOTE — TELEPHONE ENCOUNTER
Medication Refill    Medication needing refilled:  ENTRESTO    Dosage of the medication:  49-51 MG per tablet     How are you taking this medication (QD, BID, TID, QID, PRN):  TAKE ONE TABLET BY MOUTH TWICE A DAY    30 or 90 day supply called in:  180 tablet      Which Pharmacy are we sending the medication to?:    [facility] [id number] [address]   [facility], [address] [place] [address]   Phone:  [phone]  Fax:  [phone]

## 2023-12-05 ENCOUNTER — TELEPHONE (OUTPATIENT)
Dept: CARDIOLOGY CLINIC | Age: 73
End: 2023-12-05

## 2023-12-05 DIAGNOSIS — I50.22 CHRONIC SYSTOLIC HEART FAILURE (HCC): ICD-10-CM

## 2023-12-05 DIAGNOSIS — I25.5 ISCHEMIC CARDIOMYOPATHY: ICD-10-CM

## 2023-12-05 NOTE — TELEPHONE ENCOUNTER
Medication Samples    Medication:  sacubitril-valsartan (ENTRESTO     Dosage of the medication:  49-51 MG per tablet     How are you taking this medication (QD, BID, TID, QID, PRN):  Take 1 tablet by mouth 2 times daily      in the office or Mail to your home?       She can be reached at [phone].

## 2023-12-06 RX ORDER — SACUBITRIL AND VALSARTAN 49; 51 MG/1; MG/1
1 TABLET, FILM COATED ORAL 2 TIMES DAILY
Qty: 112 TABLET | Refills: 0 | COMMUNITY
Start: 2023-12-06

## 2023-12-14 NOTE — PROGRESS NOTES
[facility]  Cardiology Note      Leslie Garcia  1950, 75 y.o.    CC: CAD/CHF      Darlin Hodgkins, MD:    HPI:   This is a 75 y.o. female with a history of CAD (prior MI with PCI 2006), HTN, HLD, and CHF who presents today for management of heart failure and CAD. She was admitted to [facility] 10/2019 with CHF exacerbation. She was diuresed with Lasix and noted improvement. She was started on Entresto and her repeat echocardiogram showed an improved EF to 45-50%. Today, she returns for yearly follow up. She currently denies any cardiac symptoms. Patient denies exertional chest pain/pressure, dyspnea at rest, LINDQUIST, PND, orthopnea, palpitations, lightheadedness, weight changes, changes in LE edema, and syncope. Also admits to medical therapy compliance and tolerating. She is staying active daily.      Past Medical History:   Diagnosis Date    CAD (coronary artery disease)     HTN (hypertension)     Hyperlipidemia     Ischemic cardiomyopathy       Past Surgical History:   Procedure Laterality Date    CORONARY ANGIOPLASTY WITH STENT PLACEMENT        Family History   Problem Relation Age of Onset    Diabetes Father     Heart Disease Father     Heart Attack Sister     Heart Attack Brother     Heart Surgery Brother       Social History     Tobacco Use    Smoking status: Never    Smokeless tobacco: Never   Vaping Use    Vaping Use: Never used   Substance Use Topics    Alcohol use: Yes     Comment: sOCIALLY    Drug use: Never     No Known Allergies  Review of Systems -   Constitutional: Negative for weight gain/loss; malaise, fever  Respiratory: Negative for Asthma;  cough and hemoptysis  Cardiovascular: Negative for palpitations,dizziness   Gastrointestinal: Negative for abd.pain; constipation/diarrhea;    Genitourinary: Negative for stones; hematuria; frequency hesitancy  Integumentt: Negative for rash or pruritis  Hematologic/lymphatic: Negative for blood dyscrasia; leukemia/lymphoma  Musculoskeletal:

## 2023-12-15 ENCOUNTER — OFFICE VISIT (OUTPATIENT)
Dept: CARDIOLOGY CLINIC | Age: 73
End: 2023-12-15
Payer: MEDICARE

## 2023-12-15 VITALS
HEART RATE: 66 BPM | HEIGHT: 60 IN | WEIGHT: 150.2 LBS | BODY MASS INDEX: 29.49 KG/M2 | DIASTOLIC BLOOD PRESSURE: 74 MMHG | OXYGEN SATURATION: 98 % | SYSTOLIC BLOOD PRESSURE: 130 MMHG

## 2023-12-15 DIAGNOSIS — E78.5 HYPERLIPIDEMIA LDL GOAL <70: ICD-10-CM

## 2023-12-15 DIAGNOSIS — I25.5 ISCHEMIC CARDIOMYOPATHY: ICD-10-CM

## 2023-12-15 DIAGNOSIS — I50.23 ACUTE ON CHRONIC SYSTOLIC (CONGESTIVE) HEART FAILURE (HCC): ICD-10-CM

## 2023-12-15 DIAGNOSIS — I50.22 CHRONIC SYSTOLIC HEART FAILURE (HCC): ICD-10-CM

## 2023-12-15 DIAGNOSIS — I10 ESSENTIAL HYPERTENSION: ICD-10-CM

## 2023-12-15 DIAGNOSIS — I50.23 ACUTE ON CHRONIC SYSTOLIC (CONGESTIVE) HEART FAILURE (HCC): Primary | ICD-10-CM

## 2023-12-15 DIAGNOSIS — I25.10 CAD IN NATIVE ARTERY: ICD-10-CM

## 2023-12-15 LAB
ALBUMIN SERPL-MCNC: 4.9 G/DL (ref 3.4–5)
ALBUMIN/GLOB SERPL: 2 {RATIO} (ref 1.1–2.2)
ALP SERPL-CCNC: 62 U/L (ref 40–129)
ALT SERPL-CCNC: 14 U/L (ref 10–40)
ANION GAP SERPL CALCULATED.3IONS-SCNC: 9 MMOL/L (ref 3–16)
AST SERPL-CCNC: 19 U/L (ref 15–37)
BASOPHILS # BLD: 0 K/UL (ref 0–0.2)
BASOPHILS NFR BLD: 0.7 %
BILIRUB SERPL-MCNC: 0.5 MG/DL (ref 0–1)
BUN SERPL-MCNC: 11 MG/DL (ref 7–20)
CALCIUM SERPL-MCNC: 9.7 MG/DL (ref 8.3–10.6)
CHLORIDE SERPL-SCNC: 106 MMOL/L (ref 99–110)
CO2 SERPL-SCNC: 27 MMOL/L (ref 21–32)
CREAT SERPL-MCNC: 0.7 MG/DL (ref 0.6–1.2)
DEPRECATED RDW RBC AUTO: 13.4 % (ref 12.4–15.4)
EOSINOPHIL # BLD: 0.1 K/UL (ref 0–0.6)
EOSINOPHIL NFR BLD: 1 %
GFR SERPLBLD CREATININE-BSD FMLA CKD-EPI: >60 ML/MIN/{1.73_M2}
GLUCOSE SERPL-MCNC: 109 MG/DL (ref 70–99)
HCT VFR BLD AUTO: 41.1 % (ref 36–48)
HGB BLD-MCNC: 13.6 G/DL (ref 12–16)
LYMPHOCYTES # BLD: 1.2 K/UL (ref 1–5.1)
LYMPHOCYTES NFR BLD: 18.4 %
MCH RBC QN AUTO: 29.8 PG (ref 26–34)
MCHC RBC AUTO-ENTMCNC: 33.1 G/DL (ref 31–36)
MCV RBC AUTO: 89.9 FL (ref 80–100)
MONOCYTES # BLD: 0.4 K/UL (ref 0–1.3)
MONOCYTES NFR BLD: 6.2 %
NEUTROPHILS # BLD: 5 K/UL (ref 1.7–7.7)
NEUTROPHILS NFR BLD: 73.7 %
PLATELET # BLD AUTO: 222 K/UL (ref 135–450)
PMV BLD AUTO: 9.8 FL (ref 5–10.5)
POTASSIUM SERPL-SCNC: 4.8 MMOL/L (ref 3.5–5.1)
PROT SERPL-MCNC: 7.3 G/DL (ref 6.4–8.2)
RBC # BLD AUTO: 4.58 M/UL (ref 4–5.2)
SODIUM SERPL-SCNC: 142 MMOL/L (ref 136–145)
WBC # BLD AUTO: 6.8 K/UL (ref 4–11)

## 2023-12-15 PROCEDURE — 99214 OFFICE O/P EST MOD 30 MIN: CPT | Performed by: INTERNAL MEDICINE

## 2023-12-15 PROCEDURE — 3078F DIAST BP <80 MM HG: CPT | Performed by: INTERNAL MEDICINE

## 2023-12-15 PROCEDURE — 3075F SYST BP GE 130 - 139MM HG: CPT | Performed by: INTERNAL MEDICINE

## 2023-12-15 PROCEDURE — 1123F ACP DISCUSS/DSCN MKR DOCD: CPT | Performed by: INTERNAL MEDICINE

## 2023-12-15 PROCEDURE — 93000 ELECTROCARDIOGRAM COMPLETE: CPT | Performed by: INTERNAL MEDICINE

## 2023-12-15 RX ORDER — ROSUVASTATIN CALCIUM 40 MG/1
40 TABLET, COATED ORAL EVERY EVENING
Qty: 90 TABLET | Refills: 5 | Status: SHIPPED | OUTPATIENT
Start: 2023-12-15

## 2023-12-15 RX ORDER — SACUBITRIL AND VALSARTAN 49; 51 MG/1; MG/1
1 TABLET, FILM COATED ORAL 2 TIMES DAILY
Qty: 112 TABLET | Refills: 0 | Status: SHIPPED | OUTPATIENT
Start: 2023-12-15

## 2023-12-15 RX ORDER — FUROSEMIDE 20 MG/1
20 TABLET ORAL EVERY OTHER DAY
Qty: 45 TABLET | Refills: 5 | Status: SHIPPED | OUTPATIENT
Start: 2023-12-15

## 2023-12-15 RX ORDER — ASPIRIN 81 MG/1
TABLET, CHEWABLE ORAL
Qty: 90 TABLET | Refills: 5 | Status: SHIPPED | OUTPATIENT
Start: 2023-12-15

## 2023-12-18 DIAGNOSIS — I25.5 ISCHEMIC CARDIOMYOPATHY: ICD-10-CM

## 2023-12-18 RX ORDER — ROSUVASTATIN CALCIUM 40 MG/1
40 TABLET, COATED ORAL NIGHTLY
Qty: 90 TABLET | Refills: 5 | OUTPATIENT
Start: 2023-12-18

## 2023-12-18 RX ORDER — ASPIRIN 81 MG/1
TABLET, CHEWABLE ORAL
Qty: 90 TABLET | Refills: 5 | OUTPATIENT
Start: 2023-12-18

## 2024-03-04 ENCOUNTER — TELEPHONE (OUTPATIENT)
Dept: CARDIOLOGY CLINIC | Age: 74
End: 2024-03-04

## 2024-03-04 NOTE — TELEPHONE ENCOUNTER
Medication Samples    Medication:  sacubitril-valsartan (ENTRESTO)   Dosage of the medication:  49-51 MG per tablet  How are you taking this medication (QD, BID, TID, QID, PRN):  Take 1 tablet by mouth 2 times daily    in the office or Mail to your home?   in office [place] callback: [phone]

## 2024-03-04 NOTE — TELEPHONE ENCOUNTER
Patient has been notified to  samples        (ENTRESTO) 49-51 MG per tablet   Sig: Take 1 tablet by mouth 2 times daily

## 2024-04-19 DIAGNOSIS — I50.22 CHRONIC SYSTOLIC HEART FAILURE (HCC): ICD-10-CM

## 2024-04-19 DIAGNOSIS — I25.5 ISCHEMIC CARDIOMYOPATHY: ICD-10-CM

## 2024-04-19 RX ORDER — SACUBITRIL AND VALSARTAN 49; 51 MG/1; MG/1
1 TABLET, FILM COATED ORAL 2 TIMES DAILY
Qty: 56 TABLET | Refills: 0 | COMMUNITY
Start: 2024-04-19

## 2024-04-19 NOTE — TELEPHONE ENCOUNTER
Medication Samples    Medication:    sacubitril-valsartan (ENTRESTO)     Dosage of the medication:    49-51 MG per tablet     How are you taking this medication (QD, BID, TID, QID, PRN):     Take 1 tablet by mouth 2 times daily      in the office or Mail to your home?    [phone]

## 2024-04-19 NOTE — TELEPHONE ENCOUNTER
Called spoke with patient we have samples fo Entresto here at the [place] office. Patient states she can't make it here today and doesn't want to drive this far out.   States she can  the samples at our [place] office on Monday.    Please check to see if you have samples there.

## 2024-06-04 ENCOUNTER — TELEPHONE (OUTPATIENT)
Dept: CARDIOLOGY CLINIC | Age: 74
End: 2024-06-04

## 2024-06-04 DIAGNOSIS — I25.5 ISCHEMIC CARDIOMYOPATHY: ICD-10-CM

## 2024-06-04 DIAGNOSIS — I50.22 CHRONIC SYSTOLIC HEART FAILURE (HCC): ICD-10-CM

## 2024-06-04 RX ORDER — SACUBITRIL AND VALSARTAN 49; 51 MG/1; MG/1
1 TABLET, FILM COATED ORAL 2 TIMES DAILY
Qty: 56 TABLET | Refills: 0 | Status: CANCELLED | COMMUNITY
Start: 2024-06-04

## 2024-09-04 ENCOUNTER — TELEPHONE (OUTPATIENT)
Dept: CARDIOLOGY CLINIC | Age: 74
End: 2024-09-04

## 2024-09-04 NOTE — TELEPHONE ENCOUNTER
Medication Samples    Is this a new prescription?  no  Have you received samples of this medication in the past?  When?  Yes, 6/24    Does your Insurance cover this medication?     Have you applied for patient assistance?     Medication:  sacubitril-valsartan (ENTRESTO)     Dosage of the medication:  49-51 MG per tablet     How are you taking this medication (QD, BID, TID, QID, PRN):  Take 1 tablet by mouth 2 times daily     When was your last appointment with cardiology?    (If 1 yr or longer, please schedule appointment)    (If patient has been told they do not need to follow-up - medications should be filled by PCP)  12/23    When did you last have labs drawn?     When will you run out of your medication?   Less than a week left     Callback: [phone]

## 2024-09-06 DIAGNOSIS — I50.22 CHRONIC SYSTOLIC HEART FAILURE (HCC): ICD-10-CM

## 2024-09-06 DIAGNOSIS — I25.5 ISCHEMIC CARDIOMYOPATHY: ICD-10-CM

## 2024-09-06 RX ORDER — SACUBITRIL AND VALSARTAN 49; 51 MG/1; MG/1
1 TABLET, FILM COATED ORAL 2 TIMES DAILY
Qty: 56 TABLET | Refills: 0 | COMMUNITY
Start: 2024-09-06

## 2024-09-06 NOTE — TELEPHONE ENCOUNTER
Patient is getting samples and filling out patient assistance form     Entresto 49/51mg  LOT# [id number]  Exp 2025  2 bottles #56    Last OV: 12/15/2023  Last Labs: 12/15/2023  Last Refills: 2024  Next Appt: 2024  Last EK/15/2023

## 2024-09-06 NOTE — TELEPHONE ENCOUNTER
Patient is getting samples and filling out patient assistance form    Entresto 49/51mg  LOT# [id number]  Exp 8/2025  2 bottles #56

## 2024-09-30 DIAGNOSIS — I25.5 ISCHEMIC CARDIOMYOPATHY: ICD-10-CM

## 2024-09-30 DIAGNOSIS — I50.22 CHRONIC SYSTOLIC HEART FAILURE (HCC): ICD-10-CM

## 2024-09-30 RX ORDER — SACUBITRIL AND VALSARTAN 49; 51 MG/1; MG/1
1 TABLET, FILM COATED ORAL 2 TIMES DAILY
Qty: 60 TABLET | Refills: 12 | Status: SHIPPED | OUTPATIENT
Start: 2024-09-30

## 2024-09-30 NOTE — TELEPHONE ENCOUNTER
Medication Refill    When was your last appointment with cardiology?    (If 1 yr or longer, please schedule appointment)    (If patient has been told they do not need to follow-up - medications should be filled by PCP)  12/15/2023  When did you last have labs drawn?   12/15/2023  Medication needing refilled?  sacubitril-valsartan (ENTRESTO)   Dosage of the medication?  49-51 MG per tablet   How are you taking this medication (QD, BID, TID, QID, PRN)?  Take 1 tablet by mouth 2 times daily   Do you want a 30 or 90 day supply?  30 days  When will you run out of your medication?     Which Pharmacy are we sending this medication to?  [facility] [id number] [address]   Pharmacy Phone: [phone]  Pharmacy Fax: [phone]

## 2024-09-30 NOTE — TELEPHONE ENCOUNTER
Last OV: 12/15/23  Next OV: 12/16/24  Last refill:9/6/24  Most recent Labs: CMP 12/15/23  Last EKG (if needed):

## 2024-11-11 DIAGNOSIS — I25.5 ISCHEMIC CARDIOMYOPATHY: ICD-10-CM

## 2024-11-11 RX ORDER — CARVEDILOL 25 MG/1
25 TABLET ORAL 2 TIMES DAILY
Qty: 180 TABLET | Refills: 3 | Status: SHIPPED | OUTPATIENT
Start: 2024-11-11

## 2024-11-11 NOTE — TELEPHONE ENCOUNTER
Last OV: 12/15/23  Next OV: 12/16/24  Last refill: 12/15/22 #180 4 R/F  Most recent Labs: 12/15/23  Last EKG (if needed): 12/15/23

## 2024-11-11 NOTE — TELEPHONE ENCOUNTER
Medication Refill    Medication needing refilled:  carvedilol (COREG)     Dosage of the medication:  25 MG tablet     How are you taking this medication (QD, BID, TID, QID, PRN):  Take 1 tablet by mouth 2 times daily     30 or 90 day supply called in:  180 tablet       Which Pharmacy are we sending the medication to?:    [facility] [id number] - [place], [place] - [address] - P [phone] - F [phone]  [facility] [address]  Phone: [phone]  Fax: [phone]

## 2024-11-25 ENCOUNTER — OFFICE VISIT (OUTPATIENT)
Age: 74
End: 2024-11-25

## 2024-11-25 VITALS
SYSTOLIC BLOOD PRESSURE: 167 MMHG | HEART RATE: 81 BPM | DIASTOLIC BLOOD PRESSURE: 80 MMHG | TEMPERATURE: 98.5 F | WEIGHT: 147.2 LBS | BODY MASS INDEX: 28.75 KG/M2 | OXYGEN SATURATION: 97 %

## 2024-11-25 DIAGNOSIS — S90.119A: Primary | ICD-10-CM

## 2024-11-25 DIAGNOSIS — R03.0 ELEVATED BLOOD PRESSURE READING: ICD-10-CM

## 2024-11-25 NOTE — PROGRESS NOTES
[facility]  Betty Eilerman (:  1950 MRN: [id number]) is a 74 y.o. female, here for evaluation of the following chief complaint(s):  Toe Pain (Pt having a blood blister on her Rt foot big toe since Friday after fell turkey on her foot.)    ASSESSMENT/PLAN:    ICD-10-CM    1. Hematoma of great toe  S90.119A DRAINAGE OF HEMATOMA/FLUID     CANCELED: CT DRAINAGE HEMATOMA/SEROMA/FLUID COLLECTION      2. Elevated blood pressure reading  R03.0 Ambulatory referral to Internal Medicine          New Prescriptions    No medications on file     Summary  - I had a discussion about the patient's blood pressure and informed her of the risks of having elevated blood pressure and not getting treated for such.  I recommended that she follow up with an internist as soon as possible.  PROCEDURE: Puncture Drainage  Affected area cleaned with chlorhexidine and then alcohol.  An 18G needle was used to puncture a hole and expel 5cc of serosangous fluid. Antibiotic ointment and dressing applied.  Wound care instructions provided.  Observe for any signs of infection or other problems.   - Follow up as needed.    SUBJECTIVE/OBJECTIVE:  HPI    Vitals:    24 1113 24 1134   BP: (!) 176/81 (!) 167/80   Pulse: 81    Temp: 98.5 °F (36.9 °C)    TempSrc: Oral    SpO2: 97%    Weight: 66.8 kg (147 lb 3.2 oz)      PHYSICAL EXAM  Physical Exam  Musculoskeletal:        Feet:    Feet:      Comments: 3cm wide hematoma as depicted      An electronic signature was used to authenticate this note.    Josep Arguelles, APRN - CNP

## 2024-12-21 DIAGNOSIS — I50.22 CHRONIC SYSTOLIC HEART FAILURE (HCC): ICD-10-CM

## 2024-12-23 RX ORDER — FUROSEMIDE 20 MG/1
20 TABLET ORAL EVERY OTHER DAY
Qty: 45 TABLET | Refills: 5 | Status: SHIPPED | OUTPATIENT
Start: 2024-12-23

## 2024-12-23 RX ORDER — ROSUVASTATIN CALCIUM 40 MG/1
40 TABLET, COATED ORAL NIGHTLY
Qty: 90 TABLET | Refills: 3 | Status: SHIPPED | OUTPATIENT
Start: 2024-12-23

## 2025-02-21 DIAGNOSIS — I25.5 ISCHEMIC CARDIOMYOPATHY: ICD-10-CM

## 2025-02-21 DIAGNOSIS — I50.22 CHRONIC SYSTOLIC HEART FAILURE (HCC): ICD-10-CM

## 2025-02-21 NOTE — TELEPHONE ENCOUNTER
Medication Question/Concern    What is the name of the medication you need to speak with someone about?  sacubitril-valsartan (ENTRESTO)     Was this prescribed by your cardiologist?   Yes    Dosage of the medication:  49-51 MG per tablet     How are you taking this medication (QD, BID, TID, QID, PRN):  Take 1 tablet by mouth 2 times daily     What issues/concerns are you having with this medication?  Patient called in wanting to know if she can get samples.   States her prescription is going to cost over $100, and while she tries to find a lower price she would like samples.     Please advise.     Callback: [phone]

## 2025-02-21 NOTE — TELEPHONE ENCOUNTER
Called spoke with patient states she will fill out patient assistance forms.    I mailed out [employer] PT Assistance forms to patient for Entresto 49-51mg.  I explained what to fill out and bring back a copy of proof of income when finished with forms.     RX pending to print.

## 2025-02-21 NOTE — TELEPHONE ENCOUNTER
Last OV: 12/15/23  Next OV: 04/07/25  Last refill:09/30/24  Most recent Labs: CMP 12/15/23  Last EKG (if needed):12/15/23    Needs labs at next appt. Ok for samples?

## 2025-02-21 NOTE — TELEPHONE ENCOUNTER
Please inquire about the patient assistance pt was filling out back in September?  She has received many samples.  If she is unable to pay we can switch her medications.

## 2025-02-28 ENCOUNTER — TELEPHONE (OUTPATIENT)
Dept: CARDIOLOGY CLINIC | Age: 75
End: 2025-02-28

## 2025-02-28 NOTE — TELEPHONE ENCOUNTER
Called patient we need a copy of her SS income to send in with her [employer] patient assistance form.  She stated she will bring in next week

## 2025-03-10 NOTE — TELEPHONE ENCOUNTER
Called patient phone just rang busy, we need to see if she is going to bring SS income form to office to send with her [employer] patient assistance forms.

## 2025-05-15 ENCOUNTER — TELEPHONE (OUTPATIENT)
Dept: CARDIOLOGY CLINIC | Age: 75
End: 2025-05-15

## 2025-05-15 NOTE — TELEPHONE ENCOUNTER
Patient called in. She would like to know if she can take Claritin or another allergy medication to treat her allergies.     Please advise.     Callback: [phone]

## 2025-05-15 NOTE — TELEPHONE ENCOUNTER
Patient last seen in office 12/15/23  Next visit 7/10/25    Patient with chronic systolic heart failure, CAD, hypertension and hyperlipidemia.     Medications patient takes:  Entresto 49-51 mg  Lasix 20 mg  Crestor 40 mg  Coreg 25 mg  Aspirin 81 mg    Is patient okay to take OTC Claritin or any allergy medication.

## 2025-05-15 NOTE — TELEPHONE ENCOUNTER
Yes, patient is able to take Claritin for her allergies. Please advise her to take Claritin and not Claritin-D as it may interact with her medications.